# Patient Record
Sex: FEMALE | Race: WHITE | NOT HISPANIC OR LATINO | Employment: OTHER | URBAN - METROPOLITAN AREA
[De-identification: names, ages, dates, MRNs, and addresses within clinical notes are randomized per-mention and may not be internally consistent; named-entity substitution may affect disease eponyms.]

---

## 2023-06-13 LAB
ALANINE AMINOTRANSFERASE (SGPT) (U/L) IN SER/PLAS: 18 U/L (ref 7–45)
ALBUMIN (G/DL) IN SER/PLAS: 4.1 G/DL (ref 3.4–5)
ALBUMIN (MG/L) IN URINE: 24.5 MG/L
ALBUMIN/CREATININE (UG/MG) IN URINE: 13.3 UG/MG CRT (ref 0–30)
ALKALINE PHOSPHATASE (U/L) IN SER/PLAS: 109 U/L (ref 33–136)
ANION GAP IN SER/PLAS: 15 MMOL/L (ref 10–20)
ASPARTATE AMINOTRANSFERASE (SGOT) (U/L) IN SER/PLAS: 19 U/L (ref 9–39)
BILIRUBIN TOTAL (MG/DL) IN SER/PLAS: 0.5 MG/DL (ref 0–1.2)
CALCIDIOL (25 OH VITAMIN D3) (NG/ML) IN SER/PLAS: 69 NG/ML
CALCIUM (MG/DL) IN SER/PLAS: 9.9 MG/DL (ref 8.6–10.6)
CARBON DIOXIDE, TOTAL (MMOL/L) IN SER/PLAS: 24 MMOL/L (ref 21–32)
CHLORIDE (MMOL/L) IN SER/PLAS: 104 MMOL/L (ref 98–107)
CHOLESTEROL (MG/DL) IN SER/PLAS: 183 MG/DL (ref 0–199)
CHOLESTEROL IN HDL (MG/DL) IN SER/PLAS: 56.5 MG/DL
CHOLESTEROL/HDL RATIO: 3.2
CREATININE (MG/DL) IN SER/PLAS: 1.38 MG/DL (ref 0.5–1.05)
CREATININE (MG/DL) IN URINE: 184 MG/DL (ref 20–320)
ERYTHROCYTE DISTRIBUTION WIDTH (RATIO) BY AUTOMATED COUNT: 13.1 % (ref 11.5–14.5)
ERYTHROCYTE MEAN CORPUSCULAR HEMOGLOBIN CONCENTRATION (G/DL) BY AUTOMATED: 32.3 G/DL (ref 32–36)
ERYTHROCYTE MEAN CORPUSCULAR VOLUME (FL) BY AUTOMATED COUNT: 84 FL (ref 80–100)
ERYTHROCYTES (10*6/UL) IN BLOOD BY AUTOMATED COUNT: 5.16 X10E12/L (ref 4–5.2)
ESTIMATED AVERAGE GLUCOSE FOR HBA1C: 148 MG/DL
GFR FEMALE: 41 ML/MIN/1.73M2
GLUCOSE (MG/DL) IN SER/PLAS: 140 MG/DL (ref 74–99)
HEMATOCRIT (%) IN BLOOD BY AUTOMATED COUNT: 43.4 % (ref 36–46)
HEMOGLOBIN (G/DL) IN BLOOD: 14 G/DL (ref 12–16)
HEMOGLOBIN A1C/HEMOGLOBIN TOTAL IN BLOOD: 6.8 %
LDL: 102 MG/DL (ref 0–99)
LEUKOCYTES (10*3/UL) IN BLOOD BY AUTOMATED COUNT: 8.8 X10E9/L (ref 4.4–11.3)
NRBC (PER 100 WBCS) BY AUTOMATED COUNT: 0 /100 WBC (ref 0–0)
PLATELETS (10*3/UL) IN BLOOD AUTOMATED COUNT: 298 X10E9/L (ref 150–450)
POTASSIUM (MMOL/L) IN SER/PLAS: 4.7 MMOL/L (ref 3.5–5.3)
PROTEIN TOTAL: 6.9 G/DL (ref 6.4–8.2)
SODIUM (MMOL/L) IN SER/PLAS: 138 MMOL/L (ref 136–145)
THYROTROPIN (MIU/L) IN SER/PLAS BY DETECTION LIMIT <= 0.05 MIU/L: 3.12 MIU/L (ref 0.44–3.98)
TRIGLYCERIDE (MG/DL) IN SER/PLAS: 121 MG/DL (ref 0–149)
UREA NITROGEN (MG/DL) IN SER/PLAS: 20 MG/DL (ref 6–23)
VLDL: 24 MG/DL (ref 0–40)

## 2023-09-25 ENCOUNTER — LAB (OUTPATIENT)
Dept: LAB | Facility: LAB | Age: 68
End: 2023-09-25
Payer: MEDICARE

## 2023-09-25 ENCOUNTER — OFFICE VISIT (OUTPATIENT)
Dept: PRIMARY CARE | Facility: CLINIC | Age: 68
End: 2023-09-25
Payer: MEDICARE

## 2023-09-25 VITALS
SYSTOLIC BLOOD PRESSURE: 133 MMHG | DIASTOLIC BLOOD PRESSURE: 83 MMHG | HEIGHT: 67 IN | BODY MASS INDEX: 43.16 KG/M2 | OXYGEN SATURATION: 95 % | HEART RATE: 90 BPM | WEIGHT: 275 LBS

## 2023-09-25 DIAGNOSIS — Z12.11 SCREENING FOR COLON CANCER: ICD-10-CM

## 2023-09-25 DIAGNOSIS — Z00.00 MEDICARE ANNUAL WELLNESS VISIT, SUBSEQUENT: ICD-10-CM

## 2023-09-25 DIAGNOSIS — K21.9 GASTROESOPHAGEAL REFLUX DISEASE WITHOUT ESOPHAGITIS: ICD-10-CM

## 2023-09-25 DIAGNOSIS — N18.31 CHRONIC KIDNEY DISEASE, STAGE 3A (MULTI): ICD-10-CM

## 2023-09-25 DIAGNOSIS — Z00.00 ROUTINE GENERAL MEDICAL EXAMINATION AT HEALTH CARE FACILITY: Primary | ICD-10-CM

## 2023-09-25 DIAGNOSIS — E66.01 MORBID OBESITY (MULTI): ICD-10-CM

## 2023-09-25 DIAGNOSIS — M85.89 OTHER SPECIFIED DISORDERS OF BONE DENSITY AND STRUCTURE, MULTIPLE SITES: ICD-10-CM

## 2023-09-25 DIAGNOSIS — E11.42 TYPE 2 DIABETES MELLITUS WITH DIABETIC POLYNEUROPATHY, WITHOUT LONG-TERM CURRENT USE OF INSULIN (MULTI): ICD-10-CM

## 2023-09-25 DIAGNOSIS — Z12.31 ENCOUNTER FOR SCREENING MAMMOGRAM FOR MALIGNANT NEOPLASM OF BREAST: ICD-10-CM

## 2023-09-25 DIAGNOSIS — I48.19 PERSISTENT ATRIAL FIBRILLATION (MULTI): ICD-10-CM

## 2023-09-25 DIAGNOSIS — I10 PRIMARY HYPERTENSION: ICD-10-CM

## 2023-09-25 PROBLEM — I70.90 ASO (ARTERIOSCLEROSIS OBLITERANS): Status: ACTIVE | Noted: 2023-09-25

## 2023-09-25 PROBLEM — R60.0 PEDAL EDEMA: Status: ACTIVE | Noted: 2023-09-25

## 2023-09-25 PROBLEM — E78.5 HYPERLIPEMIA: Status: ACTIVE | Noted: 2023-09-25

## 2023-09-25 PROBLEM — F41.9 ANXIETY DISORDER: Status: ACTIVE | Noted: 2023-09-25

## 2023-09-25 PROBLEM — I48.91 ATRIAL FIBRILLATION (MULTI): Status: ACTIVE | Noted: 2023-09-25

## 2023-09-25 PROBLEM — E11.9 DIABETES MELLITUS WITHOUT COMPLICATION (MULTI): Status: ACTIVE | Noted: 2023-09-25

## 2023-09-25 PROBLEM — K27.9 PUD (PEPTIC ULCER DISEASE): Status: ACTIVE | Noted: 2023-09-25

## 2023-09-25 LAB
ALANINE AMINOTRANSFERASE (SGPT) (U/L) IN SER/PLAS: 13 U/L (ref 7–45)
ALBUMIN (G/DL) IN SER/PLAS: 4.2 G/DL (ref 3.4–5)
ALKALINE PHOSPHATASE (U/L) IN SER/PLAS: 100 U/L (ref 33–136)
ANION GAP IN SER/PLAS: 16 MMOL/L (ref 10–20)
ASPARTATE AMINOTRANSFERASE (SGOT) (U/L) IN SER/PLAS: 16 U/L (ref 9–39)
BILIRUBIN TOTAL (MG/DL) IN SER/PLAS: 0.6 MG/DL (ref 0–1.2)
CALCIUM (MG/DL) IN SER/PLAS: 10 MG/DL (ref 8.6–10.6)
CARBON DIOXIDE, TOTAL (MMOL/L) IN SER/PLAS: 26 MMOL/L (ref 21–32)
CHLORIDE (MMOL/L) IN SER/PLAS: 103 MMOL/L (ref 98–107)
CREATININE (MG/DL) IN SER/PLAS: 1.38 MG/DL (ref 0.5–1.05)
ESTIMATED AVERAGE GLUCOSE FOR HBA1C: 137 MG/DL
GFR FEMALE: 41 ML/MIN/1.73M2
GLUCOSE (MG/DL) IN SER/PLAS: 139 MG/DL (ref 74–99)
HEMOGLOBIN A1C/HEMOGLOBIN TOTAL IN BLOOD: 6.4 %
MAGNESIUM (MG/DL) IN SER/PLAS: 1.88 MG/DL (ref 1.6–2.4)
POTASSIUM (MMOL/L) IN SER/PLAS: 4.6 MMOL/L (ref 3.5–5.3)
PROTEIN TOTAL: 6.9 G/DL (ref 6.4–8.2)
SODIUM (MMOL/L) IN SER/PLAS: 140 MMOL/L (ref 136–145)
UREA NITROGEN (MG/DL) IN SER/PLAS: 23 MG/DL (ref 6–23)

## 2023-09-25 PROCEDURE — 36415 COLL VENOUS BLD VENIPUNCTURE: CPT

## 2023-09-25 PROCEDURE — 3044F HG A1C LEVEL LT 7.0%: CPT | Performed by: INTERNAL MEDICINE

## 2023-09-25 PROCEDURE — 1160F RVW MEDS BY RX/DR IN RCRD: CPT | Performed by: INTERNAL MEDICINE

## 2023-09-25 PROCEDURE — 1170F FXNL STATUS ASSESSED: CPT | Performed by: INTERNAL MEDICINE

## 2023-09-25 PROCEDURE — 3075F SYST BP GE 130 - 139MM HG: CPT | Performed by: INTERNAL MEDICINE

## 2023-09-25 PROCEDURE — G0439 PPPS, SUBSEQ VISIT: HCPCS | Performed by: INTERNAL MEDICINE

## 2023-09-25 PROCEDURE — 3079F DIAST BP 80-89 MM HG: CPT | Performed by: INTERNAL MEDICINE

## 2023-09-25 PROCEDURE — 83036 HEMOGLOBIN GLYCOSYLATED A1C: CPT

## 2023-09-25 PROCEDURE — 83735 ASSAY OF MAGNESIUM: CPT

## 2023-09-25 PROCEDURE — 99215 OFFICE O/P EST HI 40 MIN: CPT | Performed by: INTERNAL MEDICINE

## 2023-09-25 PROCEDURE — 1159F MED LIST DOCD IN RCRD: CPT | Performed by: INTERNAL MEDICINE

## 2023-09-25 PROCEDURE — 80053 COMPREHEN METABOLIC PANEL: CPT

## 2023-09-25 RX ORDER — LANCETS 33 GAUGE
EACH MISCELLANEOUS
COMMUNITY
Start: 2023-09-06 | End: 2024-04-19

## 2023-09-25 RX ORDER — ACETAMINOPHEN 500 MG
TABLET ORAL
COMMUNITY
Start: 2014-03-27

## 2023-09-25 RX ORDER — GLIPIZIDE 5 MG/1
1 TABLET, FILM COATED, EXTENDED RELEASE ORAL 2 TIMES DAILY
COMMUNITY
Start: 2018-04-23

## 2023-09-25 RX ORDER — METOPROLOL TARTRATE 50 MG/1
1 TABLET ORAL DAILY
COMMUNITY
Start: 2014-03-27 | End: 2023-09-25 | Stop reason: SDUPTHER

## 2023-09-25 RX ORDER — METOPROLOL TARTRATE 50 MG/1
50 TABLET ORAL 2 TIMES DAILY
Qty: 180 TABLET | Refills: 3 | Status: SHIPPED | OUTPATIENT
Start: 2023-09-25 | End: 2024-04-02 | Stop reason: SDUPTHER

## 2023-09-25 RX ORDER — GEMFIBROZIL 600 MG/1
600 TABLET, FILM COATED ORAL DAILY
COMMUNITY
Start: 2023-07-29 | End: 2024-02-08

## 2023-09-25 RX ORDER — BLOOD SUGAR DIAGNOSTIC
STRIP MISCELLANEOUS
COMMUNITY
Start: 2020-09-29 | End: 2024-04-19

## 2023-09-25 RX ORDER — PRAVASTATIN SODIUM 10 MG/1
1 TABLET ORAL DAILY
COMMUNITY
Start: 2022-09-23

## 2023-09-25 RX ORDER — IBUPROFEN 200 MG
TABLET ORAL
COMMUNITY
Start: 2017-07-19

## 2023-09-25 RX ORDER — LANCETS 30 GAUGE
EACH MISCELLANEOUS
COMMUNITY
Start: 2023-06-13

## 2023-09-25 RX ORDER — BLOOD SUGAR DIAGNOSTIC
STRIP MISCELLANEOUS
COMMUNITY
Start: 2019-02-04 | End: 2023-10-25 | Stop reason: SDUPTHER

## 2023-09-25 RX ORDER — CITALOPRAM 20 MG/1
1 TABLET, FILM COATED ORAL DAILY
COMMUNITY
Start: 2014-03-27 | End: 2024-04-02 | Stop reason: SDUPTHER

## 2023-09-25 RX ORDER — OMEPRAZOLE 40 MG/1
1 CAPSULE, DELAYED RELEASE ORAL DAILY
COMMUNITY
Start: 2019-02-14

## 2023-09-25 RX ORDER — HYDROCHLOROTHIAZIDE 25 MG/1
1 TABLET ORAL DAILY
COMMUNITY
Start: 2014-03-27 | End: 2023-11-17

## 2023-09-25 ASSESSMENT — ACTIVITIES OF DAILY LIVING (ADL)
TAKING_MEDICATION: INDEPENDENT
MANAGING_FINANCES: INDEPENDENT
BATHING: INDEPENDENT
DRESSING: INDEPENDENT
DOING_HOUSEWORK: INDEPENDENT
GROCERY_SHOPPING: INDEPENDENT

## 2023-09-25 ASSESSMENT — PATIENT HEALTH QUESTIONNAIRE - PHQ9
1. LITTLE INTEREST OR PLEASURE IN DOING THINGS: NOT AT ALL
SUM OF ALL RESPONSES TO PHQ9 QUESTIONS 1 AND 2: 0
2. FEELING DOWN, DEPRESSED OR HOPELESS: NOT AT ALL

## 2023-09-25 NOTE — PROGRESS NOTES
"Subjective   Patient ID: Destiny Reddy is a 68 y.o. female who presents for Foot Pain and Medicare Annual Wellness Visit Subsequent.    HPI     Saw Podiatry. Had xray. Told she has rheumatoid arthritis and was referred to another doctor, presumably Rheumatology. She told him about neuropathy and she said a med was recommended but she doesn't want to take it.  They also recommended orthotics but she declined.  The redness from last visit is resolved.    Saw Optho recently, told all ok.    Loose Bms resolved on metamucil    GERD controlled on PPI. Never had EGD.        Review of Systems    Objective   /83   Pulse 90   Ht 1.689 m (5' 6.5\")   Wt 125 kg (275 lb)   SpO2 95%   BMI 43.72 kg/m²     Physical Exam  Constitutional:       Appearance: Normal appearance.   Cardiovascular:      Rate and Rhythm: Tachycardia present. Rhythm irregular.      Heart sounds: No murmur heard.  Pulmonary:      Effort: Pulmonary effort is normal.      Breath sounds: Normal breath sounds.   Musculoskeletal:      Right lower leg: No edema.      Left lower leg: No edema.      Comments: R foot without edema or erythema.   Neurological:      General: No focal deficit present.      Mental Status: She is alert.      Gait: Gait normal.         Assessment/Plan   Problem List Items Addressed This Visit       Chronic kidney disease, stage 3a (CMS/[facility])    HTN (hypertension)    Gastroesophageal reflux disease without esophagitis    Relevant Orders    Referral to Gastroenterology    Morbid obesity (CMS/[facility])    Type 2 diabetes mellitus with diabetic polyneuropathy, without long-term current use of insulin (CMS/[facility])    Relevant Orders    Hemoglobin A1C    Comprehensive Metabolic Panel    Medicare annual wellness visit, subsequent    Screening for colon cancer    Relevant Orders    Referral to Gastroenterology    Other specified disorders of bone density and structure, multiple sites    Relevant Orders    XR DEXA bone density    Atrial fibrillation " (CMS/HCC)    Relevant Medications    metoprolol tartrate (Lopressor) 50 mg tablet    rivaroxaban (Xarelto) 20 mg tablet    Other Relevant Orders    Magnesium    Transthoracic Echo (TTE) Limited    Home sleep apnea test (HSAT)     Other Visit Diagnoses       Routine general medical examination at health care facility    -  Primary    Encounter for screening mammogram for malignant neoplasm of breast        Relevant Orders    BI mammo bilateral screening tomosynthesis             DM  -Check A1C  -Sees Optho  -Recently saw Podiatry but wont be following up with them    HTN - controlled    HLP - continue statin and gemfibrozil    CKD3 -stable    Anxiety - continue citalopram    GERD  - continue PPI, referred to GI    New onset afib - EKG with Afib with rate of 109. Discussed extensively with her today.  Will increase her metoprolol from once daily to BID and start xarelto. Get labs. Get echo. Sleep study.    Health maintenance:  -Colon cancer screening - Never had colonoscopy. Referred to GI today.  -Mammogram - Ordered  -Bone density - Ordered  -Immunizations    -Pneumonia - Prevnar 20 UTD   -Shingles - Shingrix UTD

## 2023-09-26 DIAGNOSIS — I48.19 PERSISTENT ATRIAL FIBRILLATION (MULTI): ICD-10-CM

## 2023-10-20 ENCOUNTER — TELEPHONE (OUTPATIENT)
Dept: PRIMARY CARE | Facility: CLINIC | Age: 68
End: 2023-10-20
Payer: MEDICARE

## 2023-10-20 NOTE — TELEPHONE ENCOUNTER
----- Message from Buster Palacio MD sent at 9/26/2023  4:55 PM EDT -----  A1C controlled at 6.4.  Kidney function around the same range as her normal. But given the slight decline, it would be best to lower her dose of xarelto. I sent a lower dose to her pharmacy.    Could not leave pt VM

## 2023-10-25 PROBLEM — L08.9 DIABETIC FOOT INFECTION (MULTI): Status: ACTIVE | Noted: 2023-10-25

## 2023-10-25 PROBLEM — R53.83 FATIGUE: Status: ACTIVE | Noted: 2023-10-25

## 2023-10-25 PROBLEM — R19.5 LOOSE BOWEL MOVEMENT: Status: ACTIVE | Noted: 2023-10-25

## 2023-10-25 PROBLEM — K27.9 PUD (PEPTIC ULCER DISEASE): Status: ACTIVE | Noted: 2023-10-25

## 2023-10-25 PROBLEM — R09.89 DIMINISHED PULSE: Status: ACTIVE | Noted: 2023-10-25

## 2023-10-25 PROBLEM — E55.9 VITAMIN D DEFICIENCY: Status: ACTIVE | Noted: 2023-10-25

## 2023-10-25 PROBLEM — E11.628 DIABETIC FOOT INFECTION (MULTI): Status: ACTIVE | Noted: 2023-10-25

## 2023-10-25 PROBLEM — Z79.4 CONTROLLED TYPE 2 DIABETES MELLITUS WITH COMPLICATION, WITH LONG-TERM CURRENT USE OF INSULIN (MULTI): Status: ACTIVE | Noted: 2023-10-25

## 2023-10-25 PROBLEM — E11.8 CONTROLLED TYPE 2 DIABETES MELLITUS WITH COMPLICATION, WITH LONG-TERM CURRENT USE OF INSULIN (MULTI): Status: ACTIVE | Noted: 2023-10-25

## 2023-10-25 RX ORDER — LORAZEPAM 0.5 MG
1 TABLET ORAL DAILY
COMMUNITY
Start: 2016-11-02

## 2023-10-25 RX ORDER — DEXLANSOPRAZOLE 60 MG/1
60 CAPSULE, DELAYED RELEASE ORAL
COMMUNITY
End: 2024-02-27 | Stop reason: WASHOUT

## 2023-10-25 RX ORDER — LANCETS 33 GAUGE
EACH MISCELLANEOUS
COMMUNITY

## 2023-10-25 RX ORDER — CEPHALEXIN 500 MG/1
500 CAPSULE ORAL 4 TIMES DAILY
COMMUNITY
End: 2023-10-26 | Stop reason: ALTCHOICE

## 2023-10-25 RX ORDER — NAPROXEN SODIUM 220 MG
220 TABLET ORAL 2 TIMES DAILY
COMMUNITY

## 2023-10-26 ENCOUNTER — OFFICE VISIT (OUTPATIENT)
Dept: PRIMARY CARE | Facility: CLINIC | Age: 68
End: 2023-10-26
Payer: MEDICARE

## 2023-10-26 VITALS
DIASTOLIC BLOOD PRESSURE: 85 MMHG | BODY MASS INDEX: 42.85 KG/M2 | OXYGEN SATURATION: 95 % | HEART RATE: 84 BPM | SYSTOLIC BLOOD PRESSURE: 125 MMHG | WEIGHT: 273 LBS | HEIGHT: 67 IN

## 2023-10-26 DIAGNOSIS — E11.42 TYPE 2 DIABETES MELLITUS WITH DIABETIC POLYNEUROPATHY, WITHOUT LONG-TERM CURRENT USE OF INSULIN (MULTI): ICD-10-CM

## 2023-10-26 DIAGNOSIS — I48.19 PERSISTENT ATRIAL FIBRILLATION (MULTI): ICD-10-CM

## 2023-10-26 DIAGNOSIS — R53.83 FATIGUE, UNSPECIFIED TYPE: ICD-10-CM

## 2023-10-26 DIAGNOSIS — Z12.11 SCREENING FOR COLON CANCER: Primary | ICD-10-CM

## 2023-10-26 PROBLEM — E11.628 DIABETIC FOOT INFECTION (MULTI): Status: RESOLVED | Noted: 2023-10-25 | Resolved: 2023-10-26

## 2023-10-26 PROBLEM — Z79.4 CONTROLLED TYPE 2 DIABETES MELLITUS WITH COMPLICATION, WITH LONG-TERM CURRENT USE OF INSULIN (MULTI): Status: RESOLVED | Noted: 2023-10-25 | Resolved: 2023-10-26

## 2023-10-26 PROBLEM — L08.9 DIABETIC FOOT INFECTION (MULTI): Status: RESOLVED | Noted: 2023-10-25 | Resolved: 2023-10-26

## 2023-10-26 PROBLEM — E11.8 CONTROLLED TYPE 2 DIABETES MELLITUS WITH COMPLICATION, WITH LONG-TERM CURRENT USE OF INSULIN (MULTI): Status: RESOLVED | Noted: 2023-10-25 | Resolved: 2023-10-26

## 2023-10-26 PROCEDURE — 3044F HG A1C LEVEL LT 7.0%: CPT | Performed by: INTERNAL MEDICINE

## 2023-10-26 PROCEDURE — 1159F MED LIST DOCD IN RCRD: CPT | Performed by: INTERNAL MEDICINE

## 2023-10-26 PROCEDURE — 99213 OFFICE O/P EST LOW 20 MIN: CPT | Performed by: INTERNAL MEDICINE

## 2023-10-26 PROCEDURE — 1160F RVW MEDS BY RX/DR IN RCRD: CPT | Performed by: INTERNAL MEDICINE

## 2023-10-26 PROCEDURE — 3074F SYST BP LT 130 MM HG: CPT | Performed by: INTERNAL MEDICINE

## 2023-10-26 PROCEDURE — 3079F DIAST BP 80-89 MM HG: CPT | Performed by: INTERNAL MEDICINE

## 2023-10-26 ASSESSMENT — PATIENT HEALTH QUESTIONNAIRE - PHQ9
SUM OF ALL RESPONSES TO PHQ9 QUESTIONS 1 AND 2: 0
2. FEELING DOWN, DEPRESSED OR HOPELESS: NOT AT ALL
1. LITTLE INTEREST OR PLEASURE IN DOING THINGS: NOT AT ALL

## 2023-11-17 DIAGNOSIS — I10 PRIMARY HYPERTENSION: Primary | ICD-10-CM

## 2023-11-17 RX ORDER — HYDROCHLOROTHIAZIDE 25 MG/1
25 TABLET ORAL DAILY
Qty: 90 TABLET | Refills: 3 | Status: SHIPPED | OUTPATIENT
Start: 2023-11-17

## 2023-12-06 ENCOUNTER — TELEPHONE (OUTPATIENT)
Dept: PRIMARY CARE | Facility: CLINIC | Age: 68
End: 2023-12-06
Payer: MEDICARE

## 2023-12-06 NOTE — TELEPHONE ENCOUNTER
Patient lvm with answering service that her medication was canceled. Called back to find out more information and was hung up on. Tried calling again and was hung up on again.

## 2024-02-08 DIAGNOSIS — E78.2 MIXED HYPERLIPIDEMIA: Primary | ICD-10-CM

## 2024-02-08 RX ORDER — GEMFIBROZIL 600 MG/1
600 TABLET, FILM COATED ORAL DAILY
Qty: 90 TABLET | Refills: 3 | Status: SHIPPED | OUTPATIENT
Start: 2024-02-08 | End: 2024-05-13 | Stop reason: SDUPTHER

## 2024-02-27 ENCOUNTER — OFFICE VISIT (OUTPATIENT)
Dept: PRIMARY CARE | Facility: CLINIC | Age: 69
End: 2024-02-27
Payer: MEDICARE

## 2024-02-27 ENCOUNTER — LAB (OUTPATIENT)
Dept: LAB | Facility: LAB | Age: 69
End: 2024-02-27
Payer: MEDICARE

## 2024-02-27 VITALS
HEIGHT: 67 IN | OXYGEN SATURATION: 97 % | DIASTOLIC BLOOD PRESSURE: 73 MMHG | BODY MASS INDEX: 42.38 KG/M2 | WEIGHT: 270 LBS | SYSTOLIC BLOOD PRESSURE: 126 MMHG | HEART RATE: 83 BPM

## 2024-02-27 DIAGNOSIS — E11.42 TYPE 2 DIABETES MELLITUS WITH DIABETIC POLYNEUROPATHY, WITHOUT LONG-TERM CURRENT USE OF INSULIN (MULTI): ICD-10-CM

## 2024-02-27 DIAGNOSIS — Z00.00 MEDICARE ANNUAL WELLNESS VISIT, SUBSEQUENT: ICD-10-CM

## 2024-02-27 DIAGNOSIS — E78.2 MIXED HYPERLIPIDEMIA: ICD-10-CM

## 2024-02-27 DIAGNOSIS — E66.01 MORBID OBESITY (MULTI): ICD-10-CM

## 2024-02-27 DIAGNOSIS — I10 PRIMARY HYPERTENSION: ICD-10-CM

## 2024-02-27 DIAGNOSIS — I48.19 PERSISTENT ATRIAL FIBRILLATION (MULTI): ICD-10-CM

## 2024-02-27 DIAGNOSIS — Z13.89 SCREENING FOR MULTIPLE CONDITIONS: ICD-10-CM

## 2024-02-27 DIAGNOSIS — F41.1 GENERALIZED ANXIETY DISORDER: ICD-10-CM

## 2024-02-27 DIAGNOSIS — Z00.00 ROUTINE GENERAL MEDICAL EXAMINATION AT HEALTH CARE FACILITY: Primary | ICD-10-CM

## 2024-02-27 DIAGNOSIS — N18.31 CHRONIC KIDNEY DISEASE, STAGE 3A (MULTI): ICD-10-CM

## 2024-02-27 PROBLEM — R19.5 LOOSE BOWEL MOVEMENT: Status: RESOLVED | Noted: 2023-10-25 | Resolved: 2024-02-27

## 2024-02-27 PROBLEM — K27.9 PUD (PEPTIC ULCER DISEASE): Status: RESOLVED | Noted: 2023-10-25 | Resolved: 2024-02-27

## 2024-02-27 LAB
ALBUMIN SERPL BCP-MCNC: 4 G/DL (ref 3.4–5)
ALP SERPL-CCNC: 102 U/L (ref 33–136)
ALT SERPL W P-5'-P-CCNC: 16 U/L (ref 7–45)
ANION GAP SERPL CALC-SCNC: 16 MMOL/L (ref 10–20)
AST SERPL W P-5'-P-CCNC: 14 U/L (ref 9–39)
BILIRUB SERPL-MCNC: 0.7 MG/DL (ref 0–1.2)
BUN SERPL-MCNC: 18 MG/DL (ref 6–23)
CALCIUM SERPL-MCNC: 9.7 MG/DL (ref 8.6–10.6)
CHLORIDE SERPL-SCNC: 104 MMOL/L (ref 98–107)
CO2 SERPL-SCNC: 26 MMOL/L (ref 21–32)
CREAT SERPL-MCNC: 1.3 MG/DL (ref 0.5–1.05)
CREAT UR-MCNC: 99.3 MG/DL (ref 20–320)
EGFRCR SERPLBLD CKD-EPI 2021: 44 ML/MIN/1.73M*2
ERYTHROCYTE [DISTWIDTH] IN BLOOD BY AUTOMATED COUNT: 14.7 % (ref 11.5–14.5)
EST. AVERAGE GLUCOSE BLD GHB EST-MCNC: 123 MG/DL
GLUCOSE SERPL-MCNC: 112 MG/DL (ref 74–99)
HBA1C MFR BLD: 5.9 %
HCT VFR BLD AUTO: 44.2 % (ref 36–46)
HGB BLD-MCNC: 14.1 G/DL (ref 12–16)
MCH RBC QN AUTO: 27.9 PG (ref 26–34)
MCHC RBC AUTO-ENTMCNC: 31.9 G/DL (ref 32–36)
MCV RBC AUTO: 87 FL (ref 80–100)
MICROALBUMIN UR-MCNC: 44.5 MG/L
MICROALBUMIN/CREAT UR: 44.8 UG/MG CREAT
NRBC BLD-RTO: 0 /100 WBCS (ref 0–0)
PLATELET # BLD AUTO: 290 X10*3/UL (ref 150–450)
POTASSIUM SERPL-SCNC: 4 MMOL/L (ref 3.5–5.3)
PROT SERPL-MCNC: 6.5 G/DL (ref 6.4–8.2)
RBC # BLD AUTO: 5.06 X10*6/UL (ref 4–5.2)
SODIUM SERPL-SCNC: 142 MMOL/L (ref 136–145)
WBC # BLD AUTO: 10 X10*3/UL (ref 4.4–11.3)

## 2024-02-27 PROCEDURE — 85027 COMPLETE CBC AUTOMATED: CPT

## 2024-02-27 PROCEDURE — 99214 OFFICE O/P EST MOD 30 MIN: CPT | Performed by: INTERNAL MEDICINE

## 2024-02-27 PROCEDURE — 1170F FXNL STATUS ASSESSED: CPT | Performed by: INTERNAL MEDICINE

## 2024-02-27 PROCEDURE — 3074F SYST BP LT 130 MM HG: CPT | Performed by: INTERNAL MEDICINE

## 2024-02-27 PROCEDURE — 82043 UR ALBUMIN QUANTITATIVE: CPT

## 2024-02-27 PROCEDURE — 82570 ASSAY OF URINE CREATININE: CPT

## 2024-02-27 PROCEDURE — 83036 HEMOGLOBIN GLYCOSYLATED A1C: CPT

## 2024-02-27 PROCEDURE — G0444 DEPRESSION SCREEN ANNUAL: HCPCS | Performed by: INTERNAL MEDICINE

## 2024-02-27 PROCEDURE — 36415 COLL VENOUS BLD VENIPUNCTURE: CPT

## 2024-02-27 PROCEDURE — 3078F DIAST BP <80 MM HG: CPT | Performed by: INTERNAL MEDICINE

## 2024-02-27 PROCEDURE — G0439 PPPS, SUBSEQ VISIT: HCPCS | Performed by: INTERNAL MEDICINE

## 2024-02-27 PROCEDURE — 3008F BODY MASS INDEX DOCD: CPT | Performed by: INTERNAL MEDICINE

## 2024-02-27 PROCEDURE — 80053 COMPREHEN METABOLIC PANEL: CPT

## 2024-02-27 ASSESSMENT — ACTIVITIES OF DAILY LIVING (ADL)
GROCERY_SHOPPING: INDEPENDENT
TAKING_MEDICATION: INDEPENDENT
DOING_HOUSEWORK: INDEPENDENT
BATHING: INDEPENDENT
MANAGING_FINANCES: INDEPENDENT
DRESSING: INDEPENDENT

## 2024-02-27 ASSESSMENT — ENCOUNTER SYMPTOMS: LOSS OF SENSATION IN FEET: 1

## 2024-02-27 ASSESSMENT — PATIENT HEALTH QUESTIONNAIRE - PHQ9
2. FEELING DOWN, DEPRESSED OR HOPELESS: NOT AT ALL
SUM OF ALL RESPONSES TO PHQ9 QUESTIONS 1 AND 2: 0
1. LITTLE INTEREST OR PLEASURE IN DOING THINGS: NOT AT ALL

## 2024-02-27 NOTE — PROGRESS NOTES
"Subjective   Reason for Visit: Destiny Reddy is an 69 y.o. female here for a Medicare Wellness visit and follow up.              HPI  Doing ok  Didn't do any of the tests ordered at prior visits.     No palpitations, no lightheadedness/dizziness  Tolerating xarelto, no bleeding issues.    Celexa helps with anxiety.No side effects.    No polyuria or polydipsia.  Not checking sugars.    +fatigue. Didn't schedule sleep study.    Sees dentist  Sees Optho yearly    Depression screening completed today using PHQ-2. See flowsheet for details.      Patient Care Team:  Buster Palacio MD as PCP - General (Internal Medicine)  Buster Palacio MD as PCP - [employer] PCP     Review of Systems    Objective   Vitals:  /73   Pulse 83   Ht 1.689 m (5' 6.5\")   Wt 122 kg (270 lb)   SpO2 97%   BMI 42.93 kg/m²       Physical Exam  Constitutional:       Appearance: Normal appearance.   HENT:      Right Ear: Tympanic membrane and ear canal normal.      Left Ear: Tympanic membrane and ear canal normal.      Mouth/Throat:      Mouth: Mucous membranes are moist.   Eyes:      Extraocular Movements: Extraocular movements intact.      Pupils: Pupils are equal, round, and reactive to light.   Cardiovascular:      Rate and Rhythm: Normal rate. Rhythm irregular.      Heart sounds: No murmur heard.  Pulmonary:      Effort: Pulmonary effort is normal.      Breath sounds: Normal breath sounds.   Musculoskeletal:      Cervical back: Neck supple.      Right lower leg: No edema.      Left lower leg: No edema.      Comments: Foot exam - no ulcerations or skin breakdown bilaterally, DP pulses 2+   Neurological:      Mental Status: She is alert.      Cranial Nerves: No cranial nerve deficit.      Gait: Gait normal.         Assessment/Plan   Problem List Items Addressed This Visit       Anxiety disorder    Chronic kidney disease, stage 3a (CMS/HCC)    HTN (hypertension)    Hyperlipemia    Morbid obesity (CMS/HCC)    Type 2 " diabetes mellitus with diabetic polyneuropathy, without long-term current use of insulin (CMS/[facility])    Relevant Orders    CBC    Comprehensive Metabolic Panel    Hemoglobin A1C    Albumin , Urine Random    Medicare annual wellness visit, subsequent    Atrial fibrillation (CMS/[facility])    Relevant Medications    rivaroxaban (Xarelto) 15 mg tablet     Other Visit Diagnoses       Routine general medical examination at health care facility    -  Primary    Body mass index (BMI) 40.0-44.9, adult (CMS/[facility])                   DM - controlled  -Sees Optho  -With neuropathy. Has seen Podiatry but wont be following up with them. She doesn't want any med for this.     HTN - controlled     HLP - continue statin and gemfibrozil     CKD3 -stable     Anxiety - continue citalopram     GERD  - continue PPI, referred to GI at prior visit     Afib    -Continue metoprolol   -Continue xarelto  -Get echo  -Sleep study     Health maintenance:  -Colon cancer screening - Never had colonoscopy. Referred to GI at last visit but now she would prefer Cologuard order is in from last time.  -Mammogram - order is in from last time, advised to get done.  -Bone density - order is in from last time, advised to get done.  -Immunizations    -Pneumonia - Prevnar 20 UTD   -Shingles - Shingrix UTD     Follow up 6 months

## 2024-02-27 NOTE — PATIENT INSTRUCTIONS
Please complete the Cologuard, mammogram, bone density, echocardiogram, and sleep study. All of these orders are in your chart.

## 2024-02-28 DIAGNOSIS — E11.42 TYPE 2 DIABETES MELLITUS WITH DIABETIC POLYNEUROPATHY, WITHOUT LONG-TERM CURRENT USE OF INSULIN (MULTI): Primary | ICD-10-CM

## 2024-02-28 RX ORDER — LISINOPRIL 5 MG/1
5 TABLET ORAL DAILY
Qty: 90 TABLET | Refills: 1 | Status: SHIPPED | OUTPATIENT
Start: 2024-02-28 | End: 2024-04-07 | Stop reason: SDUPTHER

## 2024-03-04 ENCOUNTER — TELEPHONE (OUTPATIENT)
Dept: PRIMARY CARE | Facility: CLINIC | Age: 69
End: 2024-03-04
Payer: MEDICARE

## 2024-03-04 NOTE — TELEPHONE ENCOUNTER
----- Message from Buster Palacio MD sent at 2/28/2024 10:35 AM EST -----  A1C controlled at 5.9.   There was a little protein in her urine. Given this, I recommend starting a med called lisinopril which helps protect the kidneys in patients with diabetes and chronic kidney issues. I sent this in. We will want to check another set of blood work 1-2 weeks after starting this. I ordered the labs too.

## 2024-03-06 ENCOUNTER — TELEPHONE (OUTPATIENT)
Dept: PRIMARY CARE | Facility: CLINIC | Age: 69
End: 2024-03-06
Payer: MEDICARE

## 2024-03-06 NOTE — TELEPHONE ENCOUNTER
Let her know I assure her this is a very safe medication and is very important to protect the kidneys in diabetics. It is very beneficial. I recommend she start it and then come see me in a few weeks to follow up on this.

## 2024-03-07 NOTE — TELEPHONE ENCOUNTER
Let her know I assure her this is a very safe medication and is very important to protect the kidneys in diabetics. It is very beneficial. I recommend she start it and then come see me in a few weeks to follow up on this.       I spoke to the patient to assure her of the safety of the medication.  She scheduled a follow up appoinment.

## 2024-03-08 ENCOUNTER — TELEPHONE (OUTPATIENT)
Dept: PRIMARY CARE | Facility: CLINIC | Age: 69
End: 2024-03-08
Payer: MEDICARE

## 2024-03-08 NOTE — TELEPHONE ENCOUNTER
Spoke to patient to give results : per dr albarran A1C controlled at 5.9.   There was a little protein in her urine. Given this, I recommend starting a med called lisinopril which helps protect the kidneys in patients with diabetes and chronic kidney issues. I sent this in. We will want to check another set of blood work 1-2 weeks after starting this. I ordered the labs too.     Appointment is made for follow up

## 2024-04-02 ENCOUNTER — APPOINTMENT (OUTPATIENT)
Dept: PRIMARY CARE | Facility: CLINIC | Age: 69
End: 2024-04-02
Payer: MEDICARE

## 2024-04-02 ENCOUNTER — TELEPHONE (OUTPATIENT)
Dept: PRIMARY CARE | Facility: CLINIC | Age: 69
End: 2024-04-02

## 2024-04-02 DIAGNOSIS — F41.1 GENERALIZED ANXIETY DISORDER: Primary | ICD-10-CM

## 2024-04-02 DIAGNOSIS — I48.19 PERSISTENT ATRIAL FIBRILLATION (MULTI): ICD-10-CM

## 2024-04-02 RX ORDER — CITALOPRAM 20 MG/1
20 TABLET, FILM COATED ORAL DAILY
Qty: 90 TABLET | Refills: 3 | Status: SHIPPED | OUTPATIENT
Start: 2024-04-02 | End: 2025-04-02

## 2024-04-02 RX ORDER — METOPROLOL TARTRATE 50 MG/1
50 TABLET ORAL 2 TIMES DAILY
Qty: 180 TABLET | Refills: 3 | Status: SHIPPED | OUTPATIENT
Start: 2024-04-02 | End: 2025-04-02

## 2024-04-02 NOTE — TELEPHONE ENCOUNTER
Pt is going to call to see what the price would be for Eliquis. She is going to call us back with what she decides.

## 2024-04-02 NOTE — TELEPHONE ENCOUNTER
I sent those refills.  She should call insurance and see if Eliquis would be covered any cheaper. If that is still too expensive then we will have to do coumadin, which isnt ideal because then she would have to go to the coumadin clinic to have her levels monitor at least once per month or more.  In the mean time before this gets sorted out, if she is completely out of xarelto she should start on a full dose 325mg aspirin once daily.

## 2024-04-02 NOTE — TELEPHONE ENCOUNTER
Pt has been unable to get her Xarelto because this year it has gone way up in price. She was wondering if she could take another medication like it instead that may be cheaper.    [facility] pharmacy is still good.     She also requested refills for her Metoprolol and Citalopram at [facility] as well.

## 2024-04-07 DIAGNOSIS — E11.42 TYPE 2 DIABETES MELLITUS WITH DIABETIC POLYNEUROPATHY, WITHOUT LONG-TERM CURRENT USE OF INSULIN (MULTI): ICD-10-CM

## 2024-04-07 DIAGNOSIS — I48.19 PERSISTENT ATRIAL FIBRILLATION (MULTI): ICD-10-CM

## 2024-04-07 RX ORDER — LISINOPRIL 5 MG/1
5 TABLET ORAL DAILY
Qty: 90 TABLET | Refills: 1 | Status: SHIPPED | OUTPATIENT
Start: 2024-04-07 | End: 2024-10-04

## 2024-04-18 DIAGNOSIS — E11.42 TYPE 2 DIABETES MELLITUS WITH DIABETIC POLYNEUROPATHY, WITHOUT LONG-TERM CURRENT USE OF INSULIN (MULTI): Primary | ICD-10-CM

## 2024-04-19 RX ORDER — BLOOD SUGAR DIAGNOSTIC
STRIP MISCELLANEOUS
Qty: 100 STRIP | Refills: 2 | Status: SHIPPED | OUTPATIENT
Start: 2024-04-19

## 2024-04-19 RX ORDER — LANCETS 33 GAUGE
EACH MISCELLANEOUS
Qty: 100 EACH | Refills: 2 | Status: SHIPPED | OUTPATIENT
Start: 2024-04-19

## 2024-05-13 DIAGNOSIS — E78.2 MIXED HYPERLIPIDEMIA: ICD-10-CM

## 2024-05-13 RX ORDER — GEMFIBROZIL 600 MG/1
600 TABLET, FILM COATED ORAL DAILY
Qty: 90 TABLET | Refills: 3 | Status: SHIPPED | OUTPATIENT
Start: 2024-05-13

## 2024-06-04 ENCOUNTER — APPOINTMENT (OUTPATIENT)
Dept: PRIMARY CARE | Facility: CLINIC | Age: 69
End: 2024-06-04
Payer: MEDICARE

## 2024-07-18 DIAGNOSIS — E11.42 TYPE 2 DIABETES MELLITUS WITH DIABETIC POLYNEUROPATHY, WITHOUT LONG-TERM CURRENT USE OF INSULIN (MULTI): ICD-10-CM

## 2024-07-18 RX ORDER — LISINOPRIL 5 MG/1
5 TABLET ORAL DAILY
Qty: 90 TABLET | Refills: 1 | Status: SHIPPED | OUTPATIENT
Start: 2024-07-18 | End: 2025-01-14

## 2024-08-10 DIAGNOSIS — I10 PRIMARY HYPERTENSION: ICD-10-CM

## 2024-08-12 RX ORDER — HYDROCHLOROTHIAZIDE 25 MG/1
25 TABLET ORAL DAILY
Qty: 100 TABLET | Refills: 2 | Status: SHIPPED | OUTPATIENT
Start: 2024-08-12

## 2024-08-27 ENCOUNTER — APPOINTMENT (OUTPATIENT)
Dept: PRIMARY CARE | Facility: CLINIC | Age: 69
End: 2024-08-27
Payer: MEDICARE

## 2024-09-12 ENCOUNTER — LAB (OUTPATIENT)
Dept: LAB | Facility: LAB | Age: 69
End: 2024-09-12
Payer: MEDICARE

## 2024-09-12 ENCOUNTER — APPOINTMENT (OUTPATIENT)
Dept: PRIMARY CARE | Facility: CLINIC | Age: 69
End: 2024-09-12
Payer: MEDICARE

## 2024-09-12 VITALS
HEART RATE: 89 BPM | BODY MASS INDEX: 42.22 KG/M2 | OXYGEN SATURATION: 98 % | HEIGHT: 67 IN | WEIGHT: 269 LBS | DIASTOLIC BLOOD PRESSURE: 86 MMHG | SYSTOLIC BLOOD PRESSURE: 121 MMHG

## 2024-09-12 DIAGNOSIS — I48.19 PERSISTENT ATRIAL FIBRILLATION (MULTI): ICD-10-CM

## 2024-09-12 DIAGNOSIS — Z23 NEED FOR INFLUENZA VACCINATION: ICD-10-CM

## 2024-09-12 DIAGNOSIS — I10 PRIMARY HYPERTENSION: ICD-10-CM

## 2024-09-12 DIAGNOSIS — E11.42 TYPE 2 DIABETES MELLITUS WITH DIABETIC POLYNEUROPATHY, WITHOUT LONG-TERM CURRENT USE OF INSULIN (MULTI): ICD-10-CM

## 2024-09-12 DIAGNOSIS — E11.42 TYPE 2 DIABETES MELLITUS WITH DIABETIC POLYNEUROPATHY, WITHOUT LONG-TERM CURRENT USE OF INSULIN (MULTI): Primary | ICD-10-CM

## 2024-09-12 DIAGNOSIS — E66.01 MORBID OBESITY (MULTI): ICD-10-CM

## 2024-09-12 PROBLEM — R09.89 DIMINISHED PULSE: Status: RESOLVED | Noted: 2023-10-25 | Resolved: 2024-09-12

## 2024-09-12 LAB
ERYTHROCYTE [DISTWIDTH] IN BLOOD BY AUTOMATED COUNT: 12.8 % (ref 11.5–14.5)
EST. AVERAGE GLUCOSE BLD GHB EST-MCNC: 123 MG/DL
HBA1C MFR BLD: 5.9 %
HCT VFR BLD AUTO: 47 % (ref 36–46)
HGB BLD-MCNC: 15 G/DL (ref 12–16)
MCH RBC QN AUTO: 27.5 PG (ref 26–34)
MCHC RBC AUTO-ENTMCNC: 31.9 G/DL (ref 32–36)
MCV RBC AUTO: 86 FL (ref 80–100)
NRBC BLD-RTO: 0 /100 WBCS (ref 0–0)
PLATELET # BLD AUTO: 316 X10*3/UL (ref 150–450)
RBC # BLD AUTO: 5.45 X10*6/UL (ref 4–5.2)
WBC # BLD AUTO: 10.7 X10*3/UL (ref 4.4–11.3)

## 2024-09-12 PROCEDURE — 83036 HEMOGLOBIN GLYCOSYLATED A1C: CPT

## 2024-09-12 PROCEDURE — 80061 LIPID PANEL: CPT

## 2024-09-12 PROCEDURE — 1158F ADVNC CARE PLAN TLK DOCD: CPT | Performed by: INTERNAL MEDICINE

## 2024-09-12 PROCEDURE — 90662 IIV NO PRSV INCREASED AG IM: CPT | Performed by: INTERNAL MEDICINE

## 2024-09-12 PROCEDURE — G2211 COMPLEX E/M VISIT ADD ON: HCPCS | Performed by: INTERNAL MEDICINE

## 2024-09-12 PROCEDURE — 1123F ACP DISCUSS/DSCN MKR DOCD: CPT | Performed by: INTERNAL MEDICINE

## 2024-09-12 PROCEDURE — 3008F BODY MASS INDEX DOCD: CPT | Performed by: INTERNAL MEDICINE

## 2024-09-12 PROCEDURE — 4010F ACE/ARB THERAPY RXD/TAKEN: CPT | Performed by: INTERNAL MEDICINE

## 2024-09-12 PROCEDURE — 36415 COLL VENOUS BLD VENIPUNCTURE: CPT

## 2024-09-12 PROCEDURE — 3079F DIAST BP 80-89 MM HG: CPT | Performed by: INTERNAL MEDICINE

## 2024-09-12 PROCEDURE — G0008 ADMIN INFLUENZA VIRUS VAC: HCPCS | Performed by: INTERNAL MEDICINE

## 2024-09-12 PROCEDURE — 1159F MED LIST DOCD IN RCRD: CPT | Performed by: INTERNAL MEDICINE

## 2024-09-12 PROCEDURE — 1036F TOBACCO NON-USER: CPT | Performed by: INTERNAL MEDICINE

## 2024-09-12 PROCEDURE — 3074F SYST BP LT 130 MM HG: CPT | Performed by: INTERNAL MEDICINE

## 2024-09-12 PROCEDURE — 99214 OFFICE O/P EST MOD 30 MIN: CPT | Performed by: INTERNAL MEDICINE

## 2024-09-12 PROCEDURE — 80053 COMPREHEN METABOLIC PANEL: CPT

## 2024-09-12 PROCEDURE — 3060F POS MICROALBUMINURIA REV: CPT | Performed by: INTERNAL MEDICINE

## 2024-09-12 PROCEDURE — 85027 COMPLETE CBC AUTOMATED: CPT

## 2024-09-12 PROCEDURE — 3044F HG A1C LEVEL LT 7.0%: CPT | Performed by: INTERNAL MEDICINE

## 2024-09-12 ASSESSMENT — PATIENT HEALTH QUESTIONNAIRE - PHQ9
1. LITTLE INTEREST OR PLEASURE IN DOING THINGS: NOT AT ALL
SUM OF ALL RESPONSES TO PHQ9 QUESTIONS 1 & 2: 0
2. FEELING DOWN, DEPRESSED OR HOPELESS: NOT AT ALL

## 2024-09-12 NOTE — PROGRESS NOTES
"Subjective   Patient ID: Destiny Reddy is a 69 y.o. female who presents for Follow-up.    HPI   Started lisinopril. Tolerating.    Metamucil controls bowels.    Weight stable   walks daily but she doesn't want to. No physical activity.  Diet high in carbs  Checks sugars infrequently.    Compliant with xarelto, denies bleeding issues.  Denies heart fluttering.      Review of Systems    Objective   /86 (BP Location: Right arm, Patient Position: Sitting)   Pulse 89   Ht 1.689 m (5' 6.5\")   Wt 122 kg (269 lb)   SpO2 98%   BMI 42.77 kg/m²     Physical Exam  Constitutional:       Appearance: Normal appearance.   Cardiovascular:      Rate and Rhythm: Normal rate. Rhythm irregular.      Heart sounds: No murmur heard.  Pulmonary:      Effort: Pulmonary effort is normal.      Breath sounds: Normal breath sounds.   Musculoskeletal:      Right lower leg: No edema.      Left lower leg: No edema.      Comments: Foot exam - no ulcerations or skin breakdown bilaterally, DP pulses 2+   Neurological:      Mental Status: She is alert.      Assessment/Plan   Problem List Items Addressed This Visit             ICD-10-CM    HTN (hypertension) I10    Morbid obesity (Multi) E66.01    Type 2 diabetes mellitus with diabetic polyneuropathy, without long-term current use of insulin (Multi) - Primary E11.42    Relevant Orders    CBC    Comprehensive Metabolic Panel    Lipid Panel    Hemoglobin A1C    Atrial fibrillation (Multi) I48.91     Other Visit Diagnoses         Codes    Need for influenza vaccination     Z23    Relevant Orders    Flu vaccine, trivalent, preservative free, HIGH-DOSE, age 65y+ (Fluzone) (Completed)               DM - controlled  -Recommended starting GLP-1 and stopping glipizide but she declines.  -Sees Optho  -With neuropathy. Has seen Podiatry but wont be following up with them. She doesn't want any med for this.     HTN - controlled     HLP - continue statin and gemfibrozil     CKD3 -stable     Anxiety - " continue citalopram     GERD  - continue PPI, referred to GI at prior visit     Afib    -Continue metoprolol   -Continue xarelto  -Ordered echo and sleep study at prior visit but never done     Health maintenance:  -Colon cancer screening - Never had colonoscopy. Referred to GI at last visit but now she would prefer Cologuard order is in from last time.  -Mammogram - order is in from prior, advised to get done.  -Bone density - order is in from prior, advised to get done.  -Immunizations    -Pneumonia - Prevnar 20 UTD   -Shingles - Shingrix UTD     Follow up 6 months MWV

## 2024-09-13 LAB
ALBUMIN SERPL BCP-MCNC: 4.2 G/DL (ref 3.4–5)
ALP SERPL-CCNC: 86 U/L (ref 33–136)
ALT SERPL W P-5'-P-CCNC: 11 U/L (ref 7–45)
ANION GAP SERPL CALC-SCNC: 19 MMOL/L (ref 10–20)
AST SERPL W P-5'-P-CCNC: 13 U/L (ref 9–39)
BILIRUB SERPL-MCNC: 0.7 MG/DL (ref 0–1.2)
BUN SERPL-MCNC: 25 MG/DL (ref 6–23)
CALCIUM SERPL-MCNC: 9.9 MG/DL (ref 8.6–10.6)
CHLORIDE SERPL-SCNC: 104 MMOL/L (ref 98–107)
CHOLEST SERPL-MCNC: 168 MG/DL (ref 0–199)
CHOLESTEROL/HDL RATIO: 3.2
CO2 SERPL-SCNC: 24 MMOL/L (ref 21–32)
CREAT SERPL-MCNC: 1.5 MG/DL (ref 0.5–1.05)
EGFRCR SERPLBLD CKD-EPI 2021: 37 ML/MIN/1.73M*2
GLUCOSE SERPL-MCNC: 118 MG/DL (ref 74–99)
HDLC SERPL-MCNC: 52.2 MG/DL
LDLC SERPL CALC-MCNC: 92 MG/DL
NON HDL CHOLESTEROL: 116 MG/DL (ref 0–149)
POTASSIUM SERPL-SCNC: 4.9 MMOL/L (ref 3.5–5.3)
PROT SERPL-MCNC: 6.9 G/DL (ref 6.4–8.2)
SODIUM SERPL-SCNC: 142 MMOL/L (ref 136–145)
TRIGL SERPL-MCNC: 117 MG/DL (ref 0–149)
VLDL: 23 MG/DL (ref 0–40)

## 2024-09-16 ENCOUNTER — TELEPHONE (OUTPATIENT)
Dept: PRIMARY CARE | Facility: CLINIC | Age: 69
End: 2024-09-16
Payer: MEDICARE

## 2024-09-16 DIAGNOSIS — E11.42 TYPE 2 DIABETES MELLITUS WITH DIABETIC POLYNEUROPATHY, WITHOUT LONG-TERM CURRENT USE OF INSULIN: Primary | ICD-10-CM

## 2024-09-16 NOTE — TELEPHONE ENCOUNTER
----- Message from Buster Hakeem sent at 9/15/2024  7:56 AM EDT -----  A1C is controlled at 5.9. Rest of labs stable. Kidney function is in the same range it has been.  I would recommend changing her glipizide to jardiance given her mildly reduced kidney function. Jardiance will control the sugars but is also beneficial and protective for the kidneys and can preserve kidney function down the road. If she is ok with this I can send it in.

## 2024-09-16 NOTE — TELEPHONE ENCOUNTER
Spoke to patient to give results per Dr Palacio : A1C is controlled at 5.9. Rest of labs stable. Kidney function is in the same range it has been.   I would recommend changing her glipizide to jardiance given her mildly reduced kidney function. Jardiance will control the sugars but is also beneficial and protective for the kidneys and can preserve kidney function down the road. If she is ok with this I can send it in.     Dr Palacio patient is alright with starting the jardiance [facility].  Patient also stated she IS interested in the 1 time trial shot that you mentioned.  Patient could not remember the shot name.

## 2024-09-16 NOTE — TELEPHONE ENCOUNTER
I would suggest either the jardiance or the injection, not both. For her, I think jardiance will be better to protect the kidneys. I would suggest just starting this and stopping the glipizide and we can talk about the injection down the road if needed. I sent [facility] to [facility].

## 2024-09-17 ENCOUNTER — TELEPHONE (OUTPATIENT)
Dept: PRIMARY CARE | Facility: CLINIC | Age: 69
End: 2024-09-17
Payer: MEDICARE

## 2024-09-17 NOTE — TELEPHONE ENCOUNTER
Spoke to patient to give follow up : per Dr Palacio   I would suggest either the jardiance or the injection, not both. For her, I think jardiance will be better to protect the kidneys. I would suggest just starting this and stopping the glipizide and we can talk about the injection down the road if needed. I [facility].       Patient understood and agrees with Dr Palacio suggestion

## 2024-09-30 ENCOUNTER — TELEPHONE (OUTPATIENT)
Dept: PRIMARY CARE | Facility: CLINIC | Age: 69
End: 2024-09-30

## 2024-09-30 ENCOUNTER — TELEMEDICINE (OUTPATIENT)
Dept: PHARMACY | Facility: HOSPITAL | Age: 69
End: 2024-09-30
Payer: MEDICARE

## 2024-09-30 DIAGNOSIS — E11.42 TYPE 2 DIABETES MELLITUS WITH DIABETIC POLYNEUROPATHY, WITHOUT LONG-TERM CURRENT USE OF INSULIN: Primary | ICD-10-CM

## 2024-09-30 DIAGNOSIS — I48.19 PERSISTENT ATRIAL FIBRILLATION (MULTI): ICD-10-CM

## 2024-09-30 NOTE — TELEPHONE ENCOUNTER
Pharmacy referral was not placed for Jardiance. Jardiance was also sent in to [facility] instead of . Placed pharmacy referral and have reached out to patient. Refer to telehealth visit on 9/30/24 for more information.     Tonny Whitaker, PharmD  Clinical Pharmacist

## 2024-09-30 NOTE — TELEPHONE ENCOUNTER
Patient called because she had not received jardiance as of yet.  Gave information to Tonny she is going to reach out to the patient.

## 2024-09-30 NOTE — PROGRESS NOTES
"Pharmacist Clinic: Diabetes   Initial Visit  Destiny Reddy is a 69 y.o. female was referred to Clinical Pharmacy Team for diabetes management.     Referring Provider: Buster Palacio MD    Subjective   HPI  Known diabetes complications include CKD and obesity  Endocrinology provider? No  Does patient have proteinuria? N/a  Special needs/barriers to therapy: Jardiance coverage     Diabetes Pharmacotherapy  - Jardiance 10 mg once daily -- never received     Previous Diabetes Pharmacotherapy   - Glipizide -- no fill hx but mentioned in Hakeem note??    Adherence: Takes medication as directed and reports no missed doses  Affordability/Accessibility  Test Claim:   Jardiance $47/month     Risk Reducing Meds  On ACEi/ARB: Yes   On Statin: Yes   On SGLT2i: Yes   On GLP1-RA: No     Glucose Monitoring:  Patient is using glucometer; n/a      Hypoglycemia? Patient denies signs and symptoms  Hyperglycemia? Denies signs and symptoms      Diet: not asked  Physical activity: not asked      Objective   Lab Review  Lab Results   Component Value Date    BILITOT 0.7 09/12/2024    CALCIUM 9.9 09/12/2024    CO2 24 09/12/2024     09/12/2024    CREATININE 1.50 (H) 09/12/2024    GLUCOSE 118 (H) 09/12/2024    ALKPHOS 86 09/12/2024    K 4.9 09/12/2024    PROT 6.9 09/12/2024     09/12/2024    AST 13 09/12/2024    ALT 11 09/12/2024    BUN 25 (H) 09/12/2024    ANIONGAP 19 09/12/2024    MG 1.88 09/25/2023    ALBUMIN 4.2 09/12/2024    GFRF 41 (A) 09/25/2023     Lab Results   Component Value Date    TRIG 117 09/12/2024    CHOL 168 09/12/2024    LDLCALC 92 09/12/2024    HDL 52.2 09/12/2024     Hemoglobin A1C (%)   Date Value   09/12/2024 5.9 (H)   02/27/2024 5.9 (H)   09/25/2023 6.4 (A)   06/13/2023 6.8 (A)   09/22/2022 6.8 (A)     No components found for: \"UACR\"  There is no height or weight on file to calculate BMI.  Wt Readings from Last 3 Encounters:   09/12/24 122 kg (269 lb)   02/27/24 122 kg (270 lb)   10/26/23 124 kg (273 lb) "          The 10-year ASCVD risk score (Raymundo KHAN, et al., 2019) is: 20%    Values used to calculate the score:      Age: 69 years      Sex: Female      Is Non- : No      Diabetic: Yes      Tobacco smoker: No      Systolic Blood Pressure: 121 mmHg      Is BP treated: Yes      HDL Cholesterol: 52.2 mg/dL      Total Cholesterol: 168 mg/dL      Drug Interactions  No significant drug-drug interactions exist that require change in therapy.    Assessment/Plan   Problem List Items Addressed This Visit          Endocrine/Metabolic    Type 2 diabetes mellitus with diabetic polyneuropathy, without long-term current use of insulin (Multi) - Primary    Relevant Medications    empagliflozin (Jardiance) 10 mg     Patients diabetes is well controlled with most recent A1c of 5.9% (goal < 7 %). Glycemic control is estimated to be at goal according to last A1c.     Patient referred for help with access to new start Jardiance. Jardiance is $47/month through her insurance which is cost prohibitive for her. Discussed  PAP in which patient makes< 400% FPL annually. Will send Jardiance to [facility] and submit application for patient assistance.       Jardiance Education:  - Educated patient on Jardiance MOA, expectations, administration and monitoring parameters.  - Counseled patient that Jardiance puts them at risk for UTI/genital candidiasis; maintaining and practicing proper hygiene will minimize risk of this side effect.   - Encouraged patient to increase fluid intake as Jardiance may cause dehydration due to potential frequent urination. Reviewed benefits in addition to glycemic control including cardioprotection, renal protection and weight loss.         Patient Assistance Screening (VAF)  Patient verbally reports monthly or yearly income which is less than 400% federal poverty level  Application for program has been submitted for the following medications:   Jardiance  Patient has been informed that  program team will be reaching out to them to discuss necessary documentation, instructed to answer phone/return voicemail.   Patient aware this process may take up to 2 weeks once income documents have been sent to the team.  If approved, medication must be filled through [facility] pharmacy and may be picked up or mailed to patient.   If approved, medication will be billed through insurance, and patient assistance team will pay the copay. This will result in a $0 copay for the patient.  Counseled patient on mechanism of action, side effects, contraindications, and what to do if the patient misses a dose. All patients questions were answered.      Plan:  START  - Jardiance 10 mg once daily       Follow Up: PRN as PAP processes   PCP Follow Up: 3/13/2025    Tonny Whitaker, PharmD    Continue all meds under the continuation of care with the referring provider and clinical pharmacy team.

## 2024-10-01 ENCOUNTER — TELEPHONE (OUTPATIENT)
Dept: PHARMACY | Facility: HOSPITAL | Age: 69
End: 2024-10-01
Payer: MEDICARE

## 2024-10-01 PROCEDURE — RXMED WILLOW AMBULATORY MEDICATION CHARGE

## 2024-10-01 NOTE — TELEPHONE ENCOUNTER
Patient Assistance Program (PAP):     We are pleased to inform you that your application for assistance has been approved.     This approval is valid through October 1, 2025  as long as the following criteria continue to be satisfied:    Your medication(s) (Jardiance) remains covered under your current insurance plan.  Your prescriber does not discontinue therapy.  You do not seek reimbursement from any other private or government-funded programs for the medication.    Under this program, the pharmacy will first bill your insurance plan for your indemnified specified medication. The [employer] Assistance Fund (VAF) will then offset your copay balance, so that your out-of pocket expense for your specialty medication will be $0.00.    Patient also on Xarelto which is included in PAP list of medications. Will reach out to PCP to get referral for Afib in order to add Xarelto to PAP profile in order to get Xarelto at no cost.     Tonny Whitaker, PharmD   Clinical Pharmacy Specialist

## 2024-10-02 DIAGNOSIS — I48.19 PERSISTENT ATRIAL FIBRILLATION (MULTI): Primary | ICD-10-CM

## 2024-10-02 PROCEDURE — RXMED WILLOW AMBULATORY MEDICATION CHARGE

## 2024-10-02 NOTE — PROGRESS NOTES
Patient takes Xarelto 15 mg daily which is also cost prohibitive. Received OK from PCP to add pharmacy referral for Afib since patient is approved for  PAP for the Jardiance, will add Xarelto to the profile to receive at no charge. Will send a years worth of refills to [facility] and have shipped to patient.     Tonny Whitaker, PharmD  Clinical Pharmacist

## 2024-10-03 ENCOUNTER — PHARMACY VISIT (OUTPATIENT)
Dept: PHARMACY | Facility: CLINIC | Age: 69
End: 2024-10-03
Payer: COMMERCIAL

## 2024-10-17 DIAGNOSIS — E11.42 TYPE 2 DIABETES MELLITUS WITH DIABETIC POLYNEUROPATHY, WITHOUT LONG-TERM CURRENT USE OF INSULIN: ICD-10-CM

## 2024-10-18 RX ORDER — LISINOPRIL 5 MG/1
5 TABLET ORAL DAILY
Qty: 100 TABLET | Refills: 2 | Status: SHIPPED | OUTPATIENT
Start: 2024-10-18

## 2024-12-30 PROCEDURE — RXMED WILLOW AMBULATORY MEDICATION CHARGE

## 2024-12-31 ENCOUNTER — PHARMACY VISIT (OUTPATIENT)
Dept: PHARMACY | Facility: CLINIC | Age: 69
End: 2024-12-31
Payer: COMMERCIAL

## 2024-12-31 DIAGNOSIS — E11.42 TYPE 2 DIABETES MELLITUS WITH DIABETIC POLYNEUROPATHY, WITHOUT LONG-TERM CURRENT USE OF INSULIN: ICD-10-CM

## 2025-01-02 RX ORDER — BLOOD SUGAR DIAGNOSTIC
STRIP MISCELLANEOUS
Qty: 100 STRIP | Refills: 2 | Status: SHIPPED | OUTPATIENT
Start: 2025-01-02

## 2025-01-02 RX ORDER — LANCETS 33 GAUGE
EACH MISCELLANEOUS
Qty: 100 EACH | Refills: 2 | Status: SHIPPED | OUTPATIENT
Start: 2025-01-02

## 2025-01-21 ENCOUNTER — TELEPHONE (OUTPATIENT)
Dept: PRIMARY CARE | Facility: CLINIC | Age: 70
End: 2025-01-21
Payer: MEDICARE

## 2025-01-21 DIAGNOSIS — E78.2 MIXED HYPERLIPIDEMIA: ICD-10-CM

## 2025-01-21 RX ORDER — GEMFIBROZIL 600 MG/1
600 TABLET, FILM COATED ORAL DAILY
Qty: 90 TABLET | Refills: 3 | Status: SHIPPED | OUTPATIENT
Start: 2025-01-21

## 2025-01-21 NOTE — TELEPHONE ENCOUNTER
Rx Refill Request Telephone Encounter    Name:  Destiny Reddy  :  [id number]  Medication Name:  gemfibrozil (Lopid) 600 mg tablet   600 mg    1 tab;e by mouth daily       Specific Pharmacy location:  [facility]  Date of last appointment:  2024  Date of next appointment:  2024  Best number to reach patient:  [phone]

## 2025-01-29 DIAGNOSIS — I48.19 PERSISTENT ATRIAL FIBRILLATION (MULTI): ICD-10-CM

## 2025-01-29 DIAGNOSIS — E11.42 TYPE 2 DIABETES MELLITUS WITH DIABETIC POLYNEUROPATHY, WITHOUT LONG-TERM CURRENT USE OF INSULIN: ICD-10-CM

## 2025-01-29 DIAGNOSIS — F41.1 GENERALIZED ANXIETY DISORDER: ICD-10-CM

## 2025-01-29 DIAGNOSIS — E78.2 MIXED HYPERLIPIDEMIA: ICD-10-CM

## 2025-01-29 DIAGNOSIS — I10 PRIMARY HYPERTENSION: ICD-10-CM

## 2025-01-29 RX ORDER — LISINOPRIL 5 MG/1
5 TABLET ORAL DAILY
Qty: 100 TABLET | Refills: 2 | Status: SHIPPED | OUTPATIENT
Start: 2025-01-29

## 2025-01-29 RX ORDER — HYDROCHLOROTHIAZIDE 25 MG/1
25 TABLET ORAL DAILY
Qty: 100 TABLET | Refills: 2 | Status: SHIPPED | OUTPATIENT
Start: 2025-01-29

## 2025-01-29 RX ORDER — GEMFIBROZIL 600 MG/1
600 TABLET, FILM COATED ORAL DAILY
Qty: 90 TABLET | Refills: 3 | Status: SHIPPED | OUTPATIENT
Start: 2025-01-29

## 2025-01-29 RX ORDER — METOPROLOL TARTRATE 50 MG/1
50 TABLET ORAL 2 TIMES DAILY
Qty: 180 TABLET | Refills: 3 | Status: SHIPPED | OUTPATIENT
Start: 2025-01-29 | End: 2026-01-29

## 2025-01-29 RX ORDER — CITALOPRAM 20 MG/1
20 TABLET, FILM COATED ORAL DAILY
Qty: 90 TABLET | Refills: 3 | Status: SHIPPED | OUTPATIENT
Start: 2025-01-29 | End: 2026-01-29

## 2025-01-29 NOTE — TELEPHONE ENCOUNTER
Rx Refill Request Telephone Encounter    Name:  Destiny Reddy  :  [id number]  Medication Name:  lisinopril  Dose : 5 mg  Directions : TAKE 1 TABLET BY MOUTH ONCE  DAILY    Medication Name:  rivaroxaban  Dose : 15 mg  Directions : Take 1 tablet (15 mg) by mouth once daily. Take with food.    Medication Name:  Empagliflozin  Dose : 10 mg  Directions : Take 1 tablet (10 mg) by mouth once daily.    Medication Name:  hydrochlorothiazide  Dose : 25 mg  Directions : TAKE 1 TABLET BY MOUTH DAILY    Medication Name:  metoprolol tartrate  Dose : 50 mg  Directions :  Take 1 tablet by mouth 2 times a day.    Medication Name:  citalopram  Dose : 20 mg  Directions :  Take 1 tablet (20 mg) by mouth once daily.    Medication Name:  gemfibrozil  Dose : 600 mg  Directions : Take 1 tablet (600 mg) by mouth once daily.  Specific Pharmacy location:  [facility] on file

## 2025-02-26 DIAGNOSIS — I48.19 PERSISTENT ATRIAL FIBRILLATION (MULTI): ICD-10-CM

## 2025-03-13 ENCOUNTER — APPOINTMENT (OUTPATIENT)
Dept: PRIMARY CARE | Facility: CLINIC | Age: 70
End: 2025-03-13
Payer: MEDICARE

## 2025-03-13 VITALS
BODY MASS INDEX: 40.34 KG/M2 | OXYGEN SATURATION: 98 % | WEIGHT: 257 LBS | HEART RATE: 92 BPM | SYSTOLIC BLOOD PRESSURE: 97 MMHG | HEIGHT: 67 IN | DIASTOLIC BLOOD PRESSURE: 68 MMHG

## 2025-03-13 DIAGNOSIS — R53.83 FATIGUE, UNSPECIFIED TYPE: ICD-10-CM

## 2025-03-13 DIAGNOSIS — E11.42 TYPE 2 DIABETES MELLITUS WITH DIABETIC POLYNEUROPATHY, WITHOUT LONG-TERM CURRENT USE OF INSULIN: ICD-10-CM

## 2025-03-13 DIAGNOSIS — Z12.11 SCREENING FOR COLON CANCER: ICD-10-CM

## 2025-03-13 DIAGNOSIS — Z00.00 ROUTINE GENERAL MEDICAL EXAMINATION AT HEALTH CARE FACILITY: Primary | ICD-10-CM

## 2025-03-13 DIAGNOSIS — Z00.00 MEDICARE ANNUAL WELLNESS VISIT, SUBSEQUENT: ICD-10-CM

## 2025-03-13 DIAGNOSIS — F41.1 GENERALIZED ANXIETY DISORDER: ICD-10-CM

## 2025-03-13 DIAGNOSIS — N18.31 CHRONIC KIDNEY DISEASE, STAGE 3A (MULTI): ICD-10-CM

## 2025-03-13 DIAGNOSIS — M85.89 OTHER SPECIFIED DISORDERS OF BONE DENSITY AND STRUCTURE, MULTIPLE SITES: ICD-10-CM

## 2025-03-13 DIAGNOSIS — I48.19 PERSISTENT ATRIAL FIBRILLATION (MULTI): ICD-10-CM

## 2025-03-13 DIAGNOSIS — N18.32 CHRONIC KIDNEY DISEASE, STAGE 3B (MULTI): ICD-10-CM

## 2025-03-13 DIAGNOSIS — I10 PRIMARY HYPERTENSION: ICD-10-CM

## 2025-03-13 DIAGNOSIS — Z12.31 ENCOUNTER FOR SCREENING MAMMOGRAM FOR MALIGNANT NEOPLASM OF BREAST: ICD-10-CM

## 2025-03-13 PROCEDURE — 1159F MED LIST DOCD IN RCRD: CPT | Performed by: INTERNAL MEDICINE

## 2025-03-13 PROCEDURE — 4010F ACE/ARB THERAPY RXD/TAKEN: CPT | Performed by: INTERNAL MEDICINE

## 2025-03-13 PROCEDURE — 1158F ADVNC CARE PLAN TLK DOCD: CPT | Performed by: INTERNAL MEDICINE

## 2025-03-13 PROCEDURE — G0439 PPPS, SUBSEQ VISIT: HCPCS | Performed by: INTERNAL MEDICINE

## 2025-03-13 PROCEDURE — 3074F SYST BP LT 130 MM HG: CPT | Performed by: INTERNAL MEDICINE

## 2025-03-13 PROCEDURE — 1123F ACP DISCUSS/DSCN MKR DOCD: CPT | Performed by: INTERNAL MEDICINE

## 2025-03-13 PROCEDURE — 1170F FXNL STATUS ASSESSED: CPT | Performed by: INTERNAL MEDICINE

## 2025-03-13 PROCEDURE — 3078F DIAST BP <80 MM HG: CPT | Performed by: INTERNAL MEDICINE

## 2025-03-13 PROCEDURE — G2211 COMPLEX E/M VISIT ADD ON: HCPCS | Performed by: INTERNAL MEDICINE

## 2025-03-13 PROCEDURE — 3008F BODY MASS INDEX DOCD: CPT | Performed by: INTERNAL MEDICINE

## 2025-03-13 PROCEDURE — 99214 OFFICE O/P EST MOD 30 MIN: CPT | Performed by: INTERNAL MEDICINE

## 2025-03-13 RX ORDER — CITALOPRAM 10 MG/1
10 TABLET ORAL DAILY
Qty: 90 TABLET | Refills: 3 | Status: SHIPPED | OUTPATIENT
Start: 2025-03-13 | End: 2026-03-13

## 2025-03-13 ASSESSMENT — ENCOUNTER SYMPTOMS
OCCASIONAL FEELINGS OF UNSTEADINESS: 0
LOSS OF SENSATION IN FEET: 0
DEPRESSION: 0

## 2025-03-13 ASSESSMENT — ACTIVITIES OF DAILY LIVING (ADL)
MANAGING_FINANCES: INDEPENDENT
DRESSING: INDEPENDENT
GROCERY_SHOPPING: INDEPENDENT
BATHING: INDEPENDENT
TAKING_MEDICATION: INDEPENDENT
DOING_HOUSEWORK: INDEPENDENT

## 2025-03-13 NOTE — PROGRESS NOTES
"Subjective   Reason for Visit: Destiny Reddy is an 70 y.o. female here for a Medicare Wellness visit and follow up.          Reviewed all medications by prescribing practitioner or clinical pharmacist (such as prescriptions, OTCs, herbal therapies and supplements) and documented in the medical record.    HPI    Tolerating jardiance  Weight down 12 pounds    But overall not feeling well, reports energy is down. Feels tired all the time. Doesn't do much. Doesn't leave house. Watches tv all day. Feet/ankles hurt. Legs feel weak.    Would like to lower dose of citalopram. Put on this in the past for some anxiety with work and is feeling better regarding this.    Not checking BP.    Tolerating xarelto, no bleeding issues.      Patient Care Team:  Buster Palacio MD as PCP - General (Internal Medicine)     Review of Systems    Objective   Vitals:  BP 97/68 (BP Location: Right arm, Patient Position: Sitting)   Pulse 92   Ht 1.689 m (5' 6.5\")   Wt 117 kg (257 lb)   SpO2 98%   BMI 40.86 kg/m²       Physical Exam  Constitutional:       Appearance: Normal appearance.   Eyes:      Extraocular Movements: Extraocular movements intact.      Pupils: Pupils are equal, round, and reactive to light.   Cardiovascular:      Rate and Rhythm: Normal rate and regular rhythm.      Heart sounds: No murmur heard.  Pulmonary:      Effort: Pulmonary effort is normal.      Breath sounds: Normal breath sounds.   Musculoskeletal:      Right lower leg: No edema.      Left lower leg: No edema.   Neurological:      General: No focal deficit present.      Mental Status: She is alert.      Cranial Nerves: No cranial nerve deficit.      Motor: Weakness (4/5 in LEs) present.      Gait: Gait abnormal (slow but steady).         Assessment & Plan  Routine general medical examination at health care facility         Medicare annual wellness visit, subsequent         Type 2 diabetes mellitus with diabetic polyneuropathy, without long-term current use of " insulin    Orders:    Comprehensive Metabolic Panel; Future    Lipid Panel; Future    Hemoglobin A1C; Future    Albumin-Creatinine Ratio, Urine Random; Future    Primary hypertension    Orders:    CBC; Future    Chronic kidney disease, stage 3a (Multi)         Fatigue, unspecified type    Orders:    Thyroid Stimulating Hormone; Future    Home sleep apnea test (HSAT); Future    Generalized anxiety disorder    Orders:    citalopram (CeleXA) 10 mg tablet; Take 1 tablet (10 mg) by mouth once daily.    Persistent atrial fibrillation (Multi)    Orders:    Home sleep apnea test (HSAT); Future    Encounter for screening mammogram for malignant neoplasm of breast    Orders:    BI mammo bilateral screening tomosynthesis; Future    Screening for colon cancer    Orders:    Colonoscopy Screening; Average Risk Patient; Future    Other specified disorders of bone density and structure, multiple sites    Orders:    XR DEXA bone density; Future    Chronic kidney disease, stage 3b (Multi)         Body mass index (BMI) 40.0-44.9, adult (Multi)                   DM - controlled  -Continue jardiance  -Sees Optho  -With neuropathy. Has seen Podiatry but wont be following up with them. She doesn't want any med for this.     HTN - stop hydrochlorothiazide      HLP - continue statin and gemfibrozil     CKD3 -stable     Anxiety - decrease citalopram     GERD  - continue PPI, referred to GI at prior visit     Afib    -Continue metoprolol   -Continue xarelto  -Ordered echo and sleep study at prior visit but never done. Discussed again today and recommended ordering. She is hesitant but ok with the sleep study, ordered. Refuses echo.    Fatigue  -BP low, stop hydrochlorothiazide  -Check labs  -Sleep study    LE weakness  -Recommended PT but she declines     Health maintenance:  -Colon cancer screening - overdue, never done, discussed and reordered.  -Mammogram - overdue, never done, discussed and reordered.  -Bone density - overdue, never done,  discussed and reordered.  -Immunizations    -Pneumonia - Prevnar 20 UTD   -Shingles - Shingrix UTD     Follow up 3 months

## 2025-03-13 NOTE — ASSESSMENT & PLAN NOTE
Orders:    Comprehensive Metabolic Panel; Future    Lipid Panel; Future    Hemoglobin A1C; Future    Albumin-Creatinine Ratio, Urine Random; Future

## 2025-03-14 LAB
ALBUMIN SERPL-MCNC: 4 G/DL (ref 3.6–5.1)
ALBUMIN/CREAT UR: 20 MG/G CREAT
ALP SERPL-CCNC: 109 U/L (ref 37–153)
ALT SERPL-CCNC: 7 U/L (ref 6–29)
ANION GAP SERPL CALCULATED.4IONS-SCNC: 15 MMOL/L (CALC) (ref 7–17)
AST SERPL-CCNC: 11 U/L (ref 10–35)
BILIRUB SERPL-MCNC: 0.4 MG/DL (ref 0.2–1.2)
BUN SERPL-MCNC: 27 MG/DL (ref 7–25)
CALCIUM SERPL-MCNC: 9.7 MG/DL (ref 8.6–10.4)
CHLORIDE SERPL-SCNC: 104 MMOL/L (ref 98–110)
CHOLEST SERPL-MCNC: 171 MG/DL
CHOLEST/HDLC SERPL: 2.9 (CALC)
CO2 SERPL-SCNC: 21 MMOL/L (ref 20–32)
CREAT SERPL-MCNC: 1.73 MG/DL (ref 0.6–1)
CREAT UR-MCNC: 162 MG/DL (ref 20–275)
EGFRCR SERPLBLD CKD-EPI 2021: 31 ML/MIN/1.73M2
ERYTHROCYTE [DISTWIDTH] IN BLOOD BY AUTOMATED COUNT: 13 % (ref 11–15)
EST. AVERAGE GLUCOSE BLD GHB EST-MCNC: 217 MG/DL
EST. AVERAGE GLUCOSE BLD GHB EST-SCNC: 12 MMOL/L
GLUCOSE SERPL-MCNC: 222 MG/DL (ref 65–99)
HBA1C MFR BLD: 9.2 % OF TOTAL HGB
HCT VFR BLD AUTO: 44.5 % (ref 35–45)
HDLC SERPL-MCNC: 60 MG/DL
HGB BLD-MCNC: 14.9 G/DL (ref 11.7–15.5)
LDLC SERPL CALC-MCNC: 87 MG/DL (CALC)
MCH RBC QN AUTO: 28.6 PG (ref 27–33)
MCHC RBC AUTO-ENTMCNC: 33.5 G/DL (ref 32–36)
MCV RBC AUTO: 85.4 FL (ref 80–100)
MICROALBUMIN UR-MCNC: 3.2 MG/DL
NONHDLC SERPL-MCNC: 111 MG/DL (CALC)
PLATELET # BLD AUTO: 283 THOUSAND/UL (ref 140–400)
PMV BLD REES-ECKER: 10.2 FL (ref 7.5–12.5)
POTASSIUM SERPL-SCNC: 5.2 MMOL/L (ref 3.5–5.3)
PROT SERPL-MCNC: 6.7 G/DL (ref 6.1–8.1)
RBC # BLD AUTO: 5.21 MILLION/UL (ref 3.8–5.1)
SODIUM SERPL-SCNC: 140 MMOL/L (ref 135–146)
TRIGL SERPL-MCNC: 141 MG/DL
TSH SERPL-ACNC: 1.95 MIU/L (ref 0.4–4.5)
WBC # BLD AUTO: 9.9 THOUSAND/UL (ref 3.8–10.8)

## 2025-03-17 DIAGNOSIS — N18.31 CHRONIC KIDNEY DISEASE, STAGE 3A (MULTI): Primary | ICD-10-CM

## 2025-03-19 ENCOUNTER — TELEPHONE (OUTPATIENT)
Dept: PRIMARY CARE | Facility: CLINIC | Age: 70
End: 2025-03-19
Payer: MEDICARE

## 2025-03-19 DIAGNOSIS — E11.42 TYPE 2 DIABETES MELLITUS WITH DIABETIC POLYNEUROPATHY, WITHOUT LONG-TERM CURRENT USE OF INSULIN: ICD-10-CM

## 2025-03-19 NOTE — TELEPHONE ENCOUNTER
Dr Palacio, patient said absolutely no injections!!!  She will however get the lab work done again        A1C jumped up to 9.2. We want this <7. I recommend adding once weekly injection mounjaro. This should get the sugars down and can help with weight loss. She needs to get more active also.   Also, creatinine level increased a little to 1.7. She should make sure she is hydrating well and I ordered a repeat lab for this to be done later this week.

## 2025-03-19 NOTE — TELEPHONE ENCOUNTER
----- Message from Buster Palacio sent at 3/17/2025  9:40 AM EDT -----  A1C jumped up to 9.2. We want this <7. I recommend adding once weekly injection mounjaro. This should get the sugars down and can help with weight loss. She needs to get more active also.  Also, creatinine level increased a little to 1.7. She should make sure she is hydrating well and I ordered a repeat lab for this to be done later this week.

## 2025-03-20 NOTE — TELEPHONE ENCOUNTER
Spoke to patient : per Dr Palacio   Ok I sent a higher dose of jardiance in for her. She needs to watch the sugars/carbs in the diet and be more physically active and try for some weight loss.     Patient understood and will get bloodwork done next week

## 2025-03-20 NOTE — TELEPHONE ENCOUNTER
Ok I sent a higher dose of jardiance in for her. She needs to watch the sugars/carbs in the diet and be more physically active and try for some weight loss.

## 2025-03-21 ENCOUNTER — TELEPHONE (OUTPATIENT)
Dept: PRIMARY CARE | Facility: CLINIC | Age: 70
End: 2025-03-21
Payer: MEDICARE

## 2025-03-21 NOTE — TELEPHONE ENCOUNTER
Patient called asking if you would give her a call back. She has questions about her medications. Please advise.

## 2025-03-25 ENCOUNTER — TELEPHONE (OUTPATIENT)
Dept: PRIMARY CARE | Facility: CLINIC | Age: 70
End: 2025-03-25
Payer: MEDICARE

## 2025-03-25 DIAGNOSIS — E11.42 TYPE 2 DIABETES MELLITUS WITH DIABETIC POLYNEUROPATHY, WITHOUT LONG-TERM CURRENT USE OF INSULIN: Primary | ICD-10-CM

## 2025-03-25 NOTE — TELEPHONE ENCOUNTER
She should continue on the higher jardiance for now. If she is serious about getting her sugar controlled, then she really needs to consider the injection - this is by far the best option. Otherwise take the higher jardiance and really focus on increased exercise and a lower sugar and carb diet.

## 2025-03-25 NOTE — TELEPHONE ENCOUNTER
Spoke to patient, she wants to know if she can get ozempic, if so she wants the patch and not the shot

## 2025-03-25 NOTE — TELEPHONE ENCOUNTER
Lily, this patient wants to talk to you, states she called on last   Friday about an injection for weight loss

## 2025-03-25 NOTE — TELEPHONE ENCOUNTER
There are no patches for these meds, only injections. Its very simple. I recommend doing the injection and I would recommend Mounjaro over Ozempic. I can send it in if she is ok with it.

## 2025-03-26 ENCOUNTER — TELEPHONE (OUTPATIENT)
Dept: PRIMARY CARE | Facility: CLINIC | Age: 70
End: 2025-03-26
Payer: MEDICARE

## 2025-03-26 RX ORDER — TIRZEPATIDE 2.5 MG/.5ML
2.5 INJECTION, SOLUTION SUBCUTANEOUS WEEKLY
Qty: 2 ML | Refills: 0 | Status: SHIPPED | OUTPATIENT
Start: 2025-03-26

## 2025-03-26 NOTE — TELEPHONE ENCOUNTER
Spoke to patient to follow up : per Dr Palacio   I sent Mounjaro, as this is better than ozempic. I also placed a Pharmacy referral so they can help titrate monthly until her next visit.

## 2025-03-26 NOTE — TELEPHONE ENCOUNTER
I sent Mounjaro, as this is better than ozempic. I also placed a Pharmacy referral so they can help titrate monthly until her next visit.

## 2025-03-28 NOTE — PROGRESS NOTES
Clinical Pharmacy Appointment    Patient ID: Destiny Reddy is a 70 y.o. female who presents for No chief complaint on file..    Pt is here for Follow Up appointment.     Referring Provider: Buster Palacio MD  PCP: Buster Palacio MD   Last visit with PCP: 3/13/2025   Next visit with PCP: 6/13/2025    Subjective   Medication Reconciliation:  Changed: ***  Added: ***  Discontinued: ***    Drug Interactions  {Drug Interactions:46225}    Medication System Management  Patient's preferred pharmacy: ***  Adherence/Organization: ***  Affordability/Accessibility: [facility] until 10/1/2025    HPI  Type II Diabetes  Current  Pharmacotherapy:   Jardiance 25 mg daily (started after 3/20/25)  Mounjaro 2.5 mg once weekly (ordered 3/26/25)     SECONDARY PREVENTION  - Statin? Pravastatin 10 mg daily   LDL:   TC:  - ACE-I/ARB? Lisinopril 5 mg daily  ALBUMIN/CREATININE RATIO, RANDOM URINE   Date Value Ref Range Status   03/13/2025 20 <30 mg/g creat Final     Comment:        The ADA defines abnormalities in albumin  excretion as follows:     Albuminuria Category        Result (mg/g creatinine)     Normal to Mildly increased   <30  Moderately increased            Severely increased           > OR = 300     The ADA recommends that at least two of three  specimens collected within a 3-6 month period be  abnormal before considering a patient to be  within a diagnostic category.       Albumin/Creatinine Ratio   Date Value Ref Range Status   02/27/2024 44.8 (H) <30.0 ug/mg Creat Final     Albumin/Creatine Ratio   Date Value Ref Range Status   06/13/2023 13.3 0.0 - 30.0 ug/mg crt Final     - Aspirin? no       Current monitoring regimen:   Patient is using: glucometer     SMBG Fasting Readings:     Hypoglycemia?    Pertinent PMH Review:  - PMH of Pancreatitis: ***  - PMH/FH of Medullary Thyroid Cancer: ***  - PMH of Retinopathy: ***  - PMH of Urinary Tract Infections: ***    Complications:  - ALETHEA: ***  - CKD: stage 3a    -The 10-year  ASCVD risk score (Raymundo KHAN, et al., 2019) is: 14.6%    Values used to calculate the score:      Age: 70 years      Sex: Female      Is Non- : No      Diabetic: Yes      Tobacco smoker: No      Systolic Blood Pressure: 97 mmHg      Is BP treated: Yes      HDL Cholesterol: 60 mg/dL      Total Cholesterol: 171 mg/dL      Diet/Lifestyle:           Objective   Allergies   Allergen Reactions    Other Unknown     Sugar water/IV     Social History     Social History Narrative    Not on file      Medication Review  Current Outpatient Medications   Medication Instructions    cholecalciferol (Vitamin D-3) 5,000 Units tablet oral    citalopram (CELEXA) 10 mg, oral, Daily    empagliflozin (JARDIANCE) 25 mg, oral, Daily    gemfibrozil (LOPID) 600 mg, oral, Daily    glucose 4 gram chewable tablet oral    lancets 33 gauge misc miscellaneous    lisinopril 5 mg, oral, Daily    metoprolol tartrate (LOPRESSOR) 50 mg, oral, 2 times daily    Mounjaro 2.5 mg, subcutaneous, Weekly    naproxen sodium (ALEVE) 220 mg, oral, 2 times daily    omeprazole (PriLOSEC) 40 mg DR capsule 1 capsule, oral, Daily    OneTouch Delica Plus Lancet 33 gauge misc TEST ONCE DAILY    OneTouch Ultra Test strip USE TO CHECK BLOOD SUGAR DAILY    OneTouch Ultra2 Meter misc     pravastatin (Pravachol) 10 mg tablet 1 tablet, oral, Daily    rivaroxaban (XARELTO) 15 mg, oral, Daily, Take with food.    vit C-s.cherry-celery-grape sd (Tart Cherry) -57-75-20 mg capsule 1 capsule, oral, Daily      Vitals  BP Readings from Last 2 Encounters:   03/13/25 97/68   09/12/24 121/86     BMI Readings from Last 1 Encounters:   03/13/25 40.86 kg/m²      Labs  A1C  Lab Results   Component Value Date    HGBA1C 9.2 (H) 03/13/2025    HGBA1C 5.9 (H) 09/12/2024    HGBA1C 5.9 (H) 02/27/2024     BMP  Lab Results   Component Value Date    CALCIUM 9.7 03/13/2025     03/13/2025    K 5.2 03/13/2025    CO2 21 03/13/2025     03/13/2025    BUN 27 (H)  03/13/2025    CREATININE 1.73 (H) 03/13/2025    EGFR 31 (L) 03/13/2025     LFTs  Lab Results   Component Value Date    ALT 7 03/13/2025    AST 11 03/13/2025    ALKPHOS 109 03/13/2025    BILITOT 0.4 03/13/2025     FLP  Lab Results   Component Value Date    TRIG 141 03/13/2025    CHOL 171 03/13/2025    LDLF 102 (H) 06/13/2023    LDLCALC 87 03/13/2025    HDL 60 03/13/2025     Urine Microalbumin  Lab Results   Component Value Date    MICROALBCREA 20 03/13/2025     Weight Management  Wt Readings from Last 3 Encounters:   03/13/25 117 kg (257 lb)   09/12/24 122 kg (269 lb)   02/27/24 122 kg (270 lb)      There is no height or weight on file to calculate BMI.     Assessment/Plan   Problem List Items Addressed This Visit    None    Patient's goal A1c is < 7%.  Is pt at goal? No, 9.2%  Patient's SMBGs are ***.     Rationale for plan: ***.    Medication Changes:  CONTINUE  Jardiance 25 mg daily  Mounjaro 2.5 mg once weekly  STOP    START    INCREASE    DECREASE      Future Considerations:      Monitoring and Education:      Clinical Pharmacist follow-up: ***, Telehealth visit      Continue all meds under the continuation of care with the referring provider and clinical pharmacy team.    Jose Wilkinson PharmD    Verbal consent to manage patient's drug therapy was obtained from the patient. They were informed they may decline to participate or withdraw from participation in pharmacy services at any time.     minimize based on how the medication works to lower blood glucose level.    Clinical Pharmacist follow-up: 5/5/2025 @1020, Telehealth visit    Continue all meds under the continuation of care with the referring provider and clinical pharmacy team.    Jose Wilkinson PharmD    Verbal consent to manage patient's drug therapy was obtained from the patient. They were informed they may decline to participate or withdraw from participation in pharmacy services at any time.

## 2025-04-07 ENCOUNTER — PROCEDURE VISIT (OUTPATIENT)
Dept: SLEEP MEDICINE | Facility: CLINIC | Age: 70
End: 2025-04-07
Payer: MEDICARE

## 2025-04-07 ENCOUNTER — TELEPHONE (OUTPATIENT)
Dept: PRIMARY CARE | Facility: CLINIC | Age: 70
End: 2025-04-07

## 2025-04-07 DIAGNOSIS — R53.83 FATIGUE, UNSPECIFIED TYPE: ICD-10-CM

## 2025-04-07 DIAGNOSIS — I48.19 PERSISTENT ATRIAL FIBRILLATION (MULTI): ICD-10-CM

## 2025-04-07 NOTE — PROGRESS NOTES
Type of Study: HOME SLEEP STUDY - NOMAD     The patient received equipment and instructions for use of the [employer] [facility] Nomad HSAT device. The patient was instructed how to apply the effort belts, cannula, thermistor. It was also explained how the Nomad and oximeter components work.  The patient was asked to record their sleep for an 8-hour period.     The patient was informed of their responsibility for the device and acknowledged this by signing the HSAT device contract. The patient was asked to return the device on 4/8/2025 between the hours of 6:30 AM- 6:30 PM to the [facility].     The patient was instructed to call 911 as usual for any medical- emergencies while at home.  The patient was also given a phone number for troubleshooting when using the device in case there were additional questions.

## 2025-04-14 ENCOUNTER — APPOINTMENT (OUTPATIENT)
Dept: PHARMACY | Facility: HOSPITAL | Age: 70
End: 2025-04-14
Payer: MEDICARE

## 2025-04-14 DIAGNOSIS — E78.2 MIXED HYPERLIPIDEMIA: ICD-10-CM

## 2025-04-14 DIAGNOSIS — E11.42 TYPE 2 DIABETES MELLITUS WITH DIABETIC POLYNEUROPATHY, WITHOUT LONG-TERM CURRENT USE OF INSULIN: ICD-10-CM

## 2025-04-14 PROCEDURE — RXMED WILLOW AMBULATORY MEDICATION CHARGE

## 2025-04-14 RX ORDER — LANCETS 30 GAUGE
EACH MISCELLANEOUS
Qty: 1 EACH | Refills: 1 | Status: SHIPPED | OUTPATIENT
Start: 2025-04-14

## 2025-04-14 RX ORDER — LANCETS 33 GAUGE
EACH MISCELLANEOUS
Qty: 100 EACH | Refills: 2 | Status: SHIPPED | OUTPATIENT
Start: 2025-04-14

## 2025-04-14 RX ORDER — BLOOD SUGAR DIAGNOSTIC
STRIP MISCELLANEOUS
Qty: 100 STRIP | Refills: 2 | Status: SHIPPED | OUTPATIENT
Start: 2025-04-14

## 2025-04-14 RX ORDER — GEMFIBROZIL 600 MG/1
600 TABLET, FILM COATED ORAL DAILY
Qty: 90 TABLET | Refills: 3 | Status: SHIPPED | OUTPATIENT
Start: 2025-04-14

## 2025-04-16 ENCOUNTER — PHARMACY VISIT (OUTPATIENT)
Dept: PHARMACY | Facility: CLINIC | Age: 70
End: 2025-04-16
Payer: MEDICARE

## 2025-04-16 LAB
NON-UH HIE BUN/CREAT RATIO: 13.3
NON-UH HIE BUN: 20 MG/DL (ref 9–23)
NON-UH HIE CALCIUM: 9.8 MG/DL (ref 8.7–10.4)
NON-UH HIE CALCULATED OSMOLALITY: 288 MOSM/KG (ref 275–295)
NON-UH HIE CHLORIDE: 105 MMOL/L (ref 98–107)
NON-UH HIE CO2, VENOUS: 27 MMOL/L (ref 20–31)
NON-UH HIE CREATININE: 1.5 MG/DL (ref 0.5–0.8)
NON-UH HIE GFR AA: 41
NON-UH HIE GLOMERULAR FILTRATION RATE: 34 ML/MIN/1.73M?
NON-UH HIE GLUCOSE: 170 MG/DL (ref 74–106)
NON-UH HIE K: 4.9 MMOL/L (ref 3.5–5.1)
NON-UH HIE NA: 141 MMOL/L (ref 135–145)

## 2025-04-21 ENCOUNTER — TELEPHONE (OUTPATIENT)
Dept: PRIMARY CARE | Facility: CLINIC | Age: 70
End: 2025-04-21
Payer: MEDICARE

## 2025-04-21 NOTE — TELEPHONE ENCOUNTER
Dr Palacio spoke to patient to give results she does not want to us the CPAP          Spoke to patient to give results : per Dr Palacio Sleep study shows severe sleep apnea, which can contribute to causing afib. I recommend treating this with a CPAP, if she agrees I can order.     Patient understood and refused the CPAP

## 2025-04-21 NOTE — TELEPHONE ENCOUNTER
----- Message from Buster Palacio sent at 4/17/2025  8:09 AM EDT -----  Sleep study shows severe sleep apnea, which can contribute to causing afib. I recommend treating this with a CPAP, if she agrees I can order.  ----- Message -----  From: Sal Newell - Lab Results In  Sent: 4/15/2025   1:50 PM EDT  To: Buster Palacio MD

## 2025-04-22 NOTE — TELEPHONE ENCOUNTER
----- Message from Buster Palacio sent at 4/17/2025  8:17 AM EDT -----  Blood work ok, kidney function back down to normal range.  ----- Message -----  From: Neelam Newell - Lab Results In  Sent: 4/16/2025   4:04 PM EDT  To: Buster Palacio MD

## 2025-04-29 PROCEDURE — RXMED WILLOW AMBULATORY MEDICATION CHARGE

## 2025-04-29 RX ORDER — TIRZEPATIDE 5 MG/.5ML
5 INJECTION, SOLUTION SUBCUTANEOUS WEEKLY
Qty: 2 ML | Refills: 3 | Status: SHIPPED | OUTPATIENT
Start: 2025-04-29

## 2025-04-29 NOTE — PROGRESS NOTES
Clinical Pharmacy Appointment    Patient ID: Destiny Reddy is a 70 y.o. female who presents for Diabetes.    Pt is here for Follow Up appointment.     Referring Provider: Buster Palacio MD  PCP: Buster Palacio MD   Last visit with PCP: 3/13/2025   Next visit with PCP: 6/13/2025    Subjective   Drug Interactions  No relevant drug interactions were noted.    Medication System Management  Patient's preferred pharmacy:  Pharmacy  Adherence/Organization: no concerns  Affordability/Accessibility:  [facility] until 10/1/2025    HPI  Type II Diabetes  Current  Pharmacotherapy:   Jardiance 25 mg daily (started after 3/20/25)  Mounjaro 2.5 mg once weekly (ordered 3/26/25)     SECONDARY PREVENTION  - Statin? Pravastatin 10 mg daily   LDL: 87   TC:  - ACE-I/ARB? Lisinopril 5 mg daily  ALBUMIN/CREATININE RATIO, RANDOM URINE   Date Value Ref Range Status   03/13/2025 20 <30 mg/g creat Final     Comment:        The ADA defines abnormalities in albumin  excretion as follows:     Albuminuria Category        Result (mg/g creatinine)     Normal to Mildly increased   <30  Moderately increased            Severely increased           > OR = 300     The ADA recommends that at least two of three  specimens collected within a 3-6 month period be  abnormal before considering a patient to be  within a diagnostic category.       Albumin/Creatinine Ratio   Date Value Ref Range Status   02/27/2024 44.8 (H) <30.0 ug/mg Creat Final     Albumin/Creatine Ratio   Date Value Ref Range Status   06/13/2023 13.3 0.0 - 30.0 ug/mg crt Final     - Aspirin? no    Current monitoring regimen:   Patient is using: glucometer     SMBG Fasting Readings: **ask to test 3-4 days per week**    Hypoglycemia? None reported    Complications:  - ALETHEA: pending sleep study  - CKD: stage 3a    -The 10-year ASCVD risk score (Raymundo DK, et al., 2019) is: 14.6%    Values used to calculate the score:      Age: 70 years      Sex: Female      Is Non-   American: No      Diabetic: Yes      Tobacco smoker: No      Systolic Blood Pressure: 97 mmHg      Is BP treated: Yes      HDL Cholesterol: 60 mg/dL      Total Cholesterol: 171 mg/dL      Diet/Lifestyle:   Not discussed this visit due to time    Objective   Allergies   Allergen Reactions    Other Unknown     Sugar water/IV     Social History     Social History Narrative    Not on file      Medication Review  Current Outpatient Medications   Medication Instructions    blood sugar diagnostic (OneTouch Ultra Test) Use to test blood glucose once daily    cholecalciferol (Vitamin D-3) 5,000 Units tablet oral    citalopram (CELEXA) 10 mg, oral, Daily    empagliflozin (JARDIANCE) 25 mg, oral, Daily    gemfibrozil (LOPID) 600 mg, oral, Daily    glucose 4 gram chewable tablet oral    lancets (OneTouch Delica Plus Lancet) 33 gauge misc Use to test blood glucose once daily    lisinopril 5 mg, oral, Daily    metoprolol tartrate (LOPRESSOR) 50 mg, oral, 2 times daily    Mounjaro 5 mg, subcutaneous, Weekly    naproxen sodium (ALEVE) 220 mg, oral, 2 times daily    omeprazole (PriLOSEC) 40 mg DR capsule 1 capsule, oral, Daily    OneTouch Ultra2 Meter misc Use to test blood glucose once daily    pravastatin (Pravachol) 10 mg tablet 1 tablet, oral, Daily    rivaroxaban (XARELTO) 15 mg, oral, Daily, Take with food.    vit C-s.cherry-celery-grape sd (Tart Cherry) -35-75-20 mg capsule 1 capsule, oral, Daily      Vitals  BP Readings from Last 2 Encounters:   03/13/25 97/68   09/12/24 121/86     BMI Readings from Last 1 Encounters:   03/13/25 40.86 kg/m²      Labs  A1C  Lab Results   Component Value Date    HGBA1C 9.2 (H) 03/13/2025    HGBA1C 5.9 (H) 09/12/2024    HGBA1C 5.9 (H) 02/27/2024     BMP  Lab Results   Component Value Date    CALCIUM 9.7 03/13/2025     03/13/2025    K 5.2 03/13/2025    CO2 21 03/13/2025     03/13/2025    BUN 27 (H) 03/13/2025    CREATININE 1.73 (H) 03/13/2025    EGFR 31 (L) 03/13/2025      LFTs  Lab Results   Component Value Date    ALT 7 03/13/2025    AST 11 03/13/2025    ALKPHOS 109 03/13/2025    BILITOT 0.4 03/13/2025     FLP  Lab Results   Component Value Date    TRIG 141 03/13/2025    CHOL 171 03/13/2025    LDLF 102 (H) 06/13/2023    LDLCALC 87 03/13/2025    HDL 60 03/13/2025     Urine Microalbumin  Lab Results   Component Value Date    MICROALBCREA 20 03/13/2025     Weight Management  Wt Readings from Last 3 Encounters:   03/13/25 117 kg (257 lb)   09/12/24 122 kg (269 lb)   02/27/24 122 kg (270 lb)      There is no height or weight on file to calculate BMI.     Assessment/Plan   Problem List Items Addressed This Visit       Type 2 diabetes mellitus with diabetic polyneuropathy, without long-term current use of insulin    Relevant Medications    tirzepatide (Mounjaro) 5 mg/0.5 mL pen injector    Other Relevant Orders    Referral to Clinical Pharmacy     Patient's goal A1c is < 7%.  Is pt at goal? No, 9.2%  Patient's SMBGs are **not currently testing.   Patient has received new testing supplies.  Rationale for plan: Patient uncontrolled with recent A1c of 9.2%. Patient not testing blood glucose regularly at home. Patient tolerated Jardiance 25 mg daily. Patient tolerating Mounjaro 2.5 mg once weekly after 3 doses. Plan to increase Mounjaro to 5 mg once weekly and follow up in 4 weeks to assess FBG readings.    Discussed testing blood glucose in morning at least 3-4 times per week.     Patient approved [facility] until 10/1/2025.    Medication Changes:  CONTINUE  Jardiance 25 mg daily    INCREASE  Mounjaro 2.5 mg to 5 mg once weekly    Future Considerations:  Increase Mounjaro as tolerated for glycemic control.    Monitoring and Education:  Discussed the titration schedule of Mounjaro. Discussed side effects and how to minimize based on how the medication works to lower blood glucose level.    Clinical Pharmacist follow-up: 5/27/2025 @1020, Telehealth visit    Continue all meds under the  continuation of care with the referring provider and clinical pharmacy team.    Jose Wilkinson PharmD    Verbal consent to manage patient's drug therapy was obtained from the patient. They were informed they may decline to participate or withdraw from participation in pharmacy services at any time.

## 2025-05-01 ENCOUNTER — PHARMACY VISIT (OUTPATIENT)
Dept: PHARMACY | Facility: CLINIC | Age: 70
End: 2025-05-01
Payer: MEDICARE

## 2025-05-05 ENCOUNTER — APPOINTMENT (OUTPATIENT)
Dept: PHARMACY | Facility: HOSPITAL | Age: 70
End: 2025-05-05
Payer: MEDICARE

## 2025-05-05 DIAGNOSIS — E11.42 TYPE 2 DIABETES MELLITUS WITH DIABETIC POLYNEUROPATHY, WITHOUT LONG-TERM CURRENT USE OF INSULIN: ICD-10-CM

## 2025-05-21 NOTE — PROGRESS NOTES
Clinical Pharmacy Appointment    Patient ID: Destiny Reddy is a 70 y.o. female who presents for Diabetes.    Pt is here for Follow Up appointment.     Referring Provider: Buster Palacio MD  PCP: Buster Palacio MD   Last visit with PCP: 3/13/2025  Next visit with PCP: 6/13/2025    Subjective   Drug Interactions  No relevant drug interactions were noted.    Medication System Management  Patient's preferred pharmacy:  Pharmacy  Adherence/Organization: no concerns  Affordability/Accessibility:  [employer] until 10/1/2025    HPI  Type II Diabetes  Current  Pharmacotherapy:   Jardiance 25 mg daily (started after 3/20/25)  Mounjaro 5 mg once weekly (ordered 3/26/25)     SECONDARY PREVENTION  - Statin? Pravastatin 10 mg daily   LDL: 87   TC:  - ACE-I/ARB? Lisinopril 5 mg daily  ALBUMIN/CREATININE RATIO, RANDOM URINE   Date Value Ref Range Status   03/13/2025 20 <30 mg/g creat Final     Comment:        The ADA defines abnormalities in albumin  excretion as follows:     Albuminuria Category        Result (mg/g creatinine)     Normal to Mildly increased   <30  Moderately increased            Severely increased           > OR = 300     The ADA recommends that at least two of three  specimens collected within a 3-6 month period be  abnormal before considering a patient to be  within a diagnostic category.       Albumin/Creatinine Ratio   Date Value Ref Range Status   02/27/2024 44.8 (H) <30.0 ug/mg Creat Final     Albumin/Creatine Ratio   Date Value Ref Range Status   06/13/2023 13.3 0.0 - 30.0 ug/mg crt Final     - Aspirin? no    Current monitoring regimen:   Patient is using: glucometer     SMBG Fasting Readings: **ask to test 3-4 days per week**    Hypoglycemia? None reported    Complications:  - ALETHEA: pending sleep study  - CKD: stage 3a    -The 10-year ASCVD risk score (Raymundo KHAN, et al., 2019) is: 14.6%    Values used to calculate the score:      Age: 70 years      Sex: Female      Is Non-   Vital signs stable. Bottle fed in the nursery during the night. Taking 20-25ml of sim, tolerating well. Age appropriate voids and stools.  Will continue to monitor.    American: No      Diabetic: Yes      Tobacco smoker: No      Systolic Blood Pressure: 97 mmHg      Is BP treated: Yes      HDL Cholesterol: 60 mg/dL      Total Cholesterol: 171 mg/dL      Diet/Lifestyle:   Not discussed this visit due to time    Objective   Allergies   Allergen Reactions    Other Unknown     Sugar water/IV     Social History     Social History Narrative    Not on file      Medication Review  Current Outpatient Medications   Medication Instructions    blood sugar diagnostic (OneTouch Ultra Test) Use to test blood glucose once daily    cholecalciferol (Vitamin D-3) 5,000 Units tablet oral    citalopram (CELEXA) 10 mg, oral, Daily    empagliflozin (JARDIANCE) 25 mg, oral, Daily    gemfibrozil (LOPID) 600 mg, oral, Daily    glucose 4 gram chewable tablet oral    lancets (OneTouch Delica Plus Lancet) 33 gauge misc Use to test blood glucose once daily    lisinopril 5 mg, oral, Daily    metoprolol tartrate (LOPRESSOR) 50 mg, oral, 2 times daily    Mounjaro 7.5 mg, subcutaneous, Weekly    naproxen sodium (ALEVE) 220 mg, oral, 2 times daily    omeprazole (PriLOSEC) 40 mg DR capsule 1 capsule, oral, Daily    OneTouch Ultra2 Meter misc Use to test blood glucose once daily    pravastatin (Pravachol) 10 mg tablet 1 tablet, oral, Daily    rivaroxaban (XARELTO) 15 mg, oral, Daily, Take with food.    vit C-s.cherry-celery-grape sd (Tart Cherry) -04-75-20 mg capsule 1 capsule, oral, Daily      Vitals  BP Readings from Last 2 Encounters:   03/13/25 97/68   09/12/24 121/86     BMI Readings from Last 1 Encounters:   03/13/25 40.86 kg/m²      Labs  A1C  Lab Results   Component Value Date    HGBA1C 9.2 (H) 03/13/2025    HGBA1C 5.9 (H) 09/12/2024    HGBA1C 5.9 (H) 02/27/2024     BMP  Lab Results   Component Value Date    CALCIUM 9.7 03/13/2025     03/13/2025    K 5.2 03/13/2025    CO2 21 03/13/2025     03/13/2025    BUN 27 (H) 03/13/2025    CREATININE 1.73 (H) 03/13/2025    EGFR 31 (L) 03/13/2025      LFTs  Lab Results   Component Value Date    ALT 7 03/13/2025    AST 11 03/13/2025    ALKPHOS 109 03/13/2025    BILITOT 0.4 03/13/2025     FLP  Lab Results   Component Value Date    TRIG 141 03/13/2025    CHOL 171 03/13/2025    LDLF 102 (H) 06/13/2023    LDLCALC 87 03/13/2025    HDL 60 03/13/2025     Urine Microalbumin  Lab Results   Component Value Date    MICROALBCREA 20 03/13/2025     Weight Management  Wt Readings from Last 3 Encounters:   03/13/25 117 kg (257 lb)   09/12/24 122 kg (269 lb)   02/27/24 122 kg (270 lb)      There is no height or weight on file to calculate BMI.     Assessment/Plan   Problem List Items Addressed This Visit       Type 2 diabetes mellitus with diabetic polyneuropathy, without long-term current use of insulin    Relevant Medications    tirzepatide (Mounjaro) 7.5 mg/0.5 mL pen injector    Other Relevant Orders    Referral to Clinical Pharmacy     Patient's goal A1c is < 7%.  Is pt at goal? No, 9.2%  Patient's SMBGs are **not currently testing. Patient states not opposed to testing BG but having difficulty with new glucometer. New glucometer is set up and asks to apply blood. Once patient applies blood the meter does not read leading to screen timing out. Discussed patient can bring meter to provider's office to help troubleshoot.     Rationale for plan: Patient uncontrolled with recent A1c of 9.2%. Patient tolerated Jardiance 25 mg daily. Patient tolerating Mounjaro 5 mg once weekly after 3 doses. Plan to increase Mounjaro to 7.5 mg once weekly and follow up in 4 weeks to assess FBG readings. Discussed weight loss goals with Mounjaro. Discussed weight loss can directly benefit glycemic control which is the primary goal.    Patient to follow up with PCP on 6/13/2025 for updated A1c.    Discussed testing blood glucose in morning at least 3-4 times per week.     Patient approved [facility] until 10/1/2025.    Medication Changes:  CONTINUE  Jardiance 25 mg daily    INCREASE  Mounjaro 5  mg to 7.5 mg once weekly    Future Considerations:  Increase Mounjaro as tolerated for glycemic control and weight loss benefit.    Monitoring and Education:  Discussed the titration schedule of Mounjaro. Discussed side effects and how to minimize based on how the medication works to lower blood glucose level.    Clinical Pharmacist follow-up: 6/24/2025 @1020, Telehealth visit    Continue all meds under the continuation of care with the referring provider and clinical pharmacy team.    Jose Wilkinson PharmD    Verbal consent to manage patient's drug therapy was obtained from the patient. They were informed they may decline to participate or withdraw from participation in pharmacy services at any time.

## 2025-05-27 ENCOUNTER — APPOINTMENT (OUTPATIENT)
Dept: PHARMACY | Facility: HOSPITAL | Age: 70
End: 2025-05-27
Payer: MEDICARE

## 2025-05-27 DIAGNOSIS — E11.42 TYPE 2 DIABETES MELLITUS WITH DIABETIC POLYNEUROPATHY, WITHOUT LONG-TERM CURRENT USE OF INSULIN: ICD-10-CM

## 2025-05-27 RX ORDER — TIRZEPATIDE 7.5 MG/.5ML
7.5 INJECTION, SOLUTION SUBCUTANEOUS WEEKLY
Qty: 2 ML | Refills: 3 | Status: SHIPPED | OUTPATIENT
Start: 2025-05-27

## 2025-05-28 DIAGNOSIS — E11.42 TYPE 2 DIABETES MELLITUS WITH DIABETIC POLYNEUROPATHY, WITHOUT LONG-TERM CURRENT USE OF INSULIN: ICD-10-CM

## 2025-05-28 DIAGNOSIS — I48.19 PERSISTENT ATRIAL FIBRILLATION (MULTI): ICD-10-CM

## 2025-05-28 RX ORDER — LISINOPRIL 5 MG/1
5 TABLET ORAL DAILY
Qty: 90 TABLET | Refills: 3 | Status: SHIPPED | OUTPATIENT
Start: 2025-05-28

## 2025-05-28 RX ORDER — METOPROLOL TARTRATE 50 MG/1
50 TABLET ORAL 2 TIMES DAILY
Qty: 180 TABLET | Refills: 3 | Status: SHIPPED | OUTPATIENT
Start: 2025-05-28 | End: 2026-05-28

## 2025-05-30 ENCOUNTER — TELEPHONE (OUTPATIENT)
Dept: PRIMARY CARE | Facility: CLINIC | Age: 70
End: 2025-05-30
Payer: MEDICARE

## 2025-05-30 NOTE — TELEPHONE ENCOUNTER
Dr Palacio, patient does not seem open to Prolia but will talk to you about it when she comes in 6/13        Spoke to patient to give results : per Dr Palacio Mammogram normal   Bone density shows osteoporosis. I recommend treating this with a medication to increase the bone strength and decrease risk of fractures.  I recommend Prolia, once every 6 month injection. If she is   open for this, I would send an order to  Specialty Pharmacy and then put a pharmacy referral for [facility], who would help determine if it's covered better by insurance injecting in our office vs   injecting at the [facility].     Patient understood

## 2025-05-30 NOTE — TELEPHONE ENCOUNTER
----- Message from Buster Palacio sent at 5/30/2025  6:47 AM EDT -----  Mammogram normal  Bone density shows osteoporosis. I recommend treating this with a medication to increase the bone strength and decrease risk of fractures.  I recommend Prolia, once every 6 month injection. If she is   open for this, I would send an order to  Specialty Pharmacy and then put a pharmacy referral for [facility], who would help determine if it's covered better by insurance injecting in our office vs   injecting at the [facility].  ----- Message -----  From: [facility] Newell - Imaging Results In  Sent: 5/29/2025   8:30 AM EDT  To: Buster Palacio MD

## 2025-06-05 PROCEDURE — RXMED WILLOW AMBULATORY MEDICATION CHARGE

## 2025-06-05 NOTE — PROGRESS NOTES
"Subjective   Patient ID: Destiny Reddy is a 68 y.o. female who presents for Follow-up.    HPI     Doing ok  No palpitations, no lightheadedness/dizziness  Tolerating xarelto, no bleeding issues.  +fatigue. Didn't schedule sleep study.    Review of Systems    Objective   /85   Pulse 84   Ht 1.689 m (5' 6.5\")   Wt 124 kg (273 lb)   SpO2 95%   BMI 43.40 kg/m²     Physical Exam  Constitutional:       Appearance: Normal appearance.   Cardiovascular:      Rate and Rhythm: Normal rate. Rhythm irregular.      Heart sounds: No murmur heard.  Pulmonary:      Effort: Pulmonary effort is normal.      Breath sounds: Normal breath sounds.   Musculoskeletal:      Right lower leg: No edema.      Left lower leg: No edema.   Neurological:      General: No focal deficit present.      Mental Status: She is alert.         Assessment/Plan   Problem List Items Addressed This Visit             ICD-10-CM    Type 2 diabetes mellitus with diabetic polyneuropathy, without long-term current use of insulin (CMS/[facility]) E11.42    Screening for colon cancer - Primary Z12.11    Relevant Orders    Cologuard® colon cancer screening    Atrial fibrillation (CMS/[facility]) I48.91    Relevant Medications    rivaroxaban (Xarelto) 15 mg tablet    Fatigue R53.83          DM - controlled  -Sees Optho  -With neuropathy. Has seen Podiatry but wont be following up with them. She doesn't want any med for this.     HTN - controlled     HLP - continue statin and gemfibrozil     CKD3 -stable     Anxiety - continue citalopram     GERD  - continue PPI, referred to GI     Afib    -Continue metoprolol   -Continue xarelto  -Get echo  -Sleep study     Health maintenance:  -Colon cancer screening - Never had colonoscopy. Referred to GI at last visit but now she would prefer Cologuard, this was ordered.  -Mammogram - Ordered  -Bone density - Ordered  -Immunizations    -Pneumonia - Prevnar 20 UTD   -Shingles - Shingrix UTD    Follow up 4 months  " 6/5/2025  Clinic Care Coordination Contact  Community Health Worker Initial Outreach    CHW Initial Information Gathering:  Referral Source: PCP  Preferred Hospital: [facility]  [phone]  Preferred Urgent Care: [facility], [phone]  Current living arrangement:: I live alone  Type of residence:: Apartment  Community Resources: Financial/Insurance, [facility] Programs  Supplies Currently Used at Home: Diabetic Supplies  Equipment Currently Used at Home: glucometer  Informal Support system:: Family, Children (mother and brothers, [facility] worker)  No PCP office visit in Past Year: No  Transportation means:: Medical transport (use [employer])  CHW Additional Questions  If ED/Hospital discharge, follow-up appointment scheduled as recommended?: N/A  Medication changes made following ED/Hospital discharge?: N/A  MyChart active?: No  Patient agreeable to assistance with activating MyChart?: No    Patient accepts CC: Yes. Patient scheduled for assessment with CC SW  on 6-13-25 at 3pm. Patient noted desire to discuss transportation to grocery store, CADI waiver for PCA services and support in the home, access VA services.   PCP referral: wondering about CADI waiver, Resources for Transportation    Called and spoke with patient regarding CCC referral from PCP.  Engaged in [employer].  Patient share background info:  -lives alone in an apartment  -has SNAP benefit $23/month  -has [employer] but has difficulty riding it because he does not being in the [place], too many people in the van.   He would a like personal ride   -vision impair   -has PTSD  -Certified disabled from [facility] [place].   -receives S.S.D[employer]I benefit  -  -had issues with VA doctor in [facility]. Would to access VA services and see a different VA doctor  -has MH providers  -has a representative that takes care of his bills, find resources, name is Eliane  [phone].  -has [facility] worker Melanie  [phone]  Will need ALL to [place]  Patient gave verbal permission to talk to Eliane and Melanie about care coordination services.  CHW encouraged patient to share his goals or needs with [facility] worker for support to access VA services and regarding CADI waiver.    CHW called Eliane and left VM to call back to clarify roles and services to coordinate care for pt.  CHW called [facility] worker Melanie [phone] and left VM to call   back to clarify roles and services to coordinate care for pt.    Patient wants:  -CADI waiver services-PCA, ILS worker to take to grocery store  -Access to VA doctor closer to home and VA services  -transportation to grocery store like Sarah Harman  Community Health Worker  [facility] Care Coordination  [email or web address]  [email or web address]   Office: [phone]  Fax: [phone]

## 2025-06-06 ENCOUNTER — PHARMACY VISIT (OUTPATIENT)
Dept: PHARMACY | Facility: CLINIC | Age: 70
End: 2025-06-06
Payer: MEDICARE

## 2025-06-13 ENCOUNTER — APPOINTMENT (OUTPATIENT)
Dept: PRIMARY CARE | Facility: CLINIC | Age: 70
End: 2025-06-13
Payer: MEDICARE

## 2025-06-13 VITALS
HEIGHT: 67 IN | DIASTOLIC BLOOD PRESSURE: 83 MMHG | SYSTOLIC BLOOD PRESSURE: 120 MMHG | WEIGHT: 247 LBS | OXYGEN SATURATION: 94 % | BODY MASS INDEX: 38.77 KG/M2 | HEART RATE: 98 BPM

## 2025-06-13 DIAGNOSIS — I10 PRIMARY HYPERTENSION: ICD-10-CM

## 2025-06-13 DIAGNOSIS — M81.0 AGE-RELATED OSTEOPOROSIS WITHOUT CURRENT PATHOLOGICAL FRACTURE: ICD-10-CM

## 2025-06-13 DIAGNOSIS — G47.33 OSA (OBSTRUCTIVE SLEEP APNEA): ICD-10-CM

## 2025-06-13 DIAGNOSIS — R73.9 HYPERGLYCEMIA: Primary | ICD-10-CM

## 2025-06-13 DIAGNOSIS — E66.01 MORBID OBESITY (MULTI): ICD-10-CM

## 2025-06-13 DIAGNOSIS — E11.42 TYPE 2 DIABETES MELLITUS WITH DIABETIC POLYNEUROPATHY, WITHOUT LONG-TERM CURRENT USE OF INSULIN: ICD-10-CM

## 2025-06-13 PROCEDURE — 4010F ACE/ARB THERAPY RXD/TAKEN: CPT | Performed by: INTERNAL MEDICINE

## 2025-06-13 PROCEDURE — 3074F SYST BP LT 130 MM HG: CPT | Performed by: INTERNAL MEDICINE

## 2025-06-13 PROCEDURE — 3079F DIAST BP 80-89 MM HG: CPT | Performed by: INTERNAL MEDICINE

## 2025-06-13 PROCEDURE — 99214 OFFICE O/P EST MOD 30 MIN: CPT | Performed by: INTERNAL MEDICINE

## 2025-06-13 PROCEDURE — 1159F MED LIST DOCD IN RCRD: CPT | Performed by: INTERNAL MEDICINE

## 2025-06-13 PROCEDURE — 1036F TOBACCO NON-USER: CPT | Performed by: INTERNAL MEDICINE

## 2025-06-13 PROCEDURE — 3008F BODY MASS INDEX DOCD: CPT | Performed by: INTERNAL MEDICINE

## 2025-06-13 PROCEDURE — G2211 COMPLEX E/M VISIT ADD ON: HCPCS | Performed by: INTERNAL MEDICINE

## 2025-06-13 RX ORDER — BLOOD-GLUCOSE,RECEIVER,CONT
1 EACH MISCELLANEOUS AS NEEDED
COMMUNITY
End: 2025-06-13 | Stop reason: SDUPTHER

## 2025-06-13 RX ORDER — BLOOD-GLUCOSE,RECEIVER,CONT
EACH MISCELLANEOUS
Qty: 1 EACH | Refills: 0 | Status: SHIPPED | OUTPATIENT
Start: 2025-06-13

## 2025-06-13 RX ORDER — BLOOD-GLUCOSE SENSOR
EACH MISCELLANEOUS
Qty: 6 EACH | Refills: 1 | Status: SHIPPED | OUTPATIENT
Start: 2025-06-13

## 2025-06-13 ASSESSMENT — PATIENT HEALTH QUESTIONNAIRE - PHQ9
SUM OF ALL RESPONSES TO PHQ9 QUESTIONS 1 & 2: 0
1. LITTLE INTEREST OR PLEASURE IN DOING THINGS: NOT AT ALL
2. FEELING DOWN, DEPRESSED OR HOPELESS: NOT AT ALL

## 2025-06-13 NOTE — PROGRESS NOTES
"Subjective   Patient ID: Destiny Reddy is a 70 y.o. female who presents for Follow-up.    HPI   Weight down 10 pounds.  Tolerating mounjaro, no side effects  Appetite is lower    Sleep study showed severe ALETHEA but she refuses CPAP.    Off hydrochlorothiazide  BP looks well  Not checking BP at home.    Discussed bone density    Review of Systems    Objective   /83 (BP Location: Right arm, Patient Position: Sitting)   Pulse 98   Ht 1.689 m (5' 6.5\")   Wt 112 kg (247 lb)   SpO2 94%   BMI 39.27 kg/m²     Physical Exam  Constitutional:       Appearance: Normal appearance.   Cardiovascular:      Rate and Rhythm: Normal rate. Rhythm irregular.      Heart sounds: No murmur heard.  Pulmonary:      Effort: Pulmonary effort is normal.      Breath sounds: Normal breath sounds.   Musculoskeletal:      Right lower leg: No edema.      Left lower leg: No edema.   Neurological:      General: No focal deficit present.      Mental Status: She is alert.         Assessment/Plan   Problem List Items Addressed This Visit           ICD-10-CM    HTN (hypertension) I10    Morbid obesity (Multi) E66.01    Type 2 diabetes mellitus with diabetic polyneuropathy, without long-term current use of insulin E11.42    Relevant Medications    blood-glucose,,cont (FreeStyle Мария 3 Oliver) misc    blood-glucose sensor (FreeStyle Мария 3 Sensor) device    Other Relevant Orders    Hemoglobin A1C    Comprehensive Metabolic Panel    ALETHEA (obstructive sleep apnea) G47.33    Age-related osteoporosis without current pathological fracture M81.0     Other Visit Diagnoses         Codes      Hyperglycemia    -  Primary R73.9               DM - check a1c  -Continue jardiance  -Continue mounjaro  -Sees Optho  -With neuropathy. Has seen Podiatry but wont be following up with them. She doesn't want any med for this.     HTN - controlled      HLP - continue statin and gemfibrozil     CKD3 -stable     Anxiety - conitnue citalopram     GERD  - continue " PPI, referred to GI at prior visit     Afib    -Continue metoprolol   -Continue xarelto  -Refused echo    ALETHEA - pt refuses CPAP     Fatigue  -discussed treating ALETHEA, she refuses. Discussed continuing weight loss.     LE weakness  -Recommended PT but she declines    Osteoporosis - recommended prolia, she will consider     Health maintenance:  -Colon cancer screening - overdue, never done, order is in  -Mammogram - nml 5/2025  -Bone density - as above  -Immunizations    -Pneumonia - Prevnar 20 UTD   -Shingles - Shingrix UTD     Follow up 4 months

## 2025-06-14 LAB
ALBUMIN SERPL-MCNC: 4.2 G/DL (ref 3.6–5.1)
ALP SERPL-CCNC: 93 U/L (ref 37–153)
ALT SERPL-CCNC: 9 U/L (ref 6–29)
ANION GAP SERPL CALCULATED.4IONS-SCNC: 11 MMOL/L (CALC) (ref 7–17)
AST SERPL-CCNC: 14 U/L (ref 10–35)
BILIRUB SERPL-MCNC: 0.6 MG/DL (ref 0.2–1.2)
BUN SERPL-MCNC: 23 MG/DL (ref 7–25)
CALCIUM SERPL-MCNC: 9.6 MG/DL (ref 8.6–10.4)
CHLORIDE SERPL-SCNC: 103 MMOL/L (ref 98–110)
CO2 SERPL-SCNC: 24 MMOL/L (ref 20–32)
CREAT SERPL-MCNC: 1.47 MG/DL (ref 0.6–1)
EGFRCR SERPLBLD CKD-EPI 2021: 38 ML/MIN/1.73M2
EST. AVERAGE GLUCOSE BLD GHB EST-MCNC: 146 MG/DL
EST. AVERAGE GLUCOSE BLD GHB EST-SCNC: 8.1 MMOL/L
GLUCOSE SERPL-MCNC: 157 MG/DL (ref 65–99)
HBA1C MFR BLD: 6.7 %
POTASSIUM SERPL-SCNC: 4.6 MMOL/L (ref 3.5–5.3)
PROT SERPL-MCNC: 7 G/DL (ref 6.1–8.1)
SODIUM SERPL-SCNC: 138 MMOL/L (ref 135–146)

## 2025-06-17 ENCOUNTER — TELEPHONE (OUTPATIENT)
Dept: PRIMARY CARE | Facility: CLINIC | Age: 70
End: 2025-06-17
Payer: MEDICARE

## 2025-06-17 DIAGNOSIS — E11.42 TYPE 2 DIABETES MELLITUS WITH DIABETIC POLYNEUROPATHY, WITHOUT LONG-TERM CURRENT USE OF INSULIN: ICD-10-CM

## 2025-06-17 NOTE — TELEPHONE ENCOUNTER
----- Message from Buster Palacio sent at 6/14/2025 10:28 AM EDT -----  A1C now controlled at 6.7. Rest of labs stable. Continue same meds.  ----- Message -----  From: [facility] Results In  Sent: 6/14/2025   4:46 AM EDT  To: Buster Palacio MD

## 2025-06-20 NOTE — PROGRESS NOTES
Clinical Pharmacy Appointment    Patient ID: Destiny Reddy is a 70 y.o. female who presents for Diabetes.    Pt is here for Follow Up appointment.     Referring Provider: Buster Palacio MD  PCP: Buster Palacio MD   Last visit with PCP: 6/13/2025  Next visit with PCP: 10/13/2025    Subjective   Drug Interactions  No relevant drug interactions were noted.    Medication System Management  Patient's preferred pharmacy:  Pharmacy  Adherence/Organization: no concerns  Affordability/Accessibility: [facility] until 10/1/2025    HPI  Type II Diabetes  Current  Pharmacotherapy:   Jardiance 25 mg daily (started after 3/20/25)  Mounjaro 7.5 mg once weekly (ordered 3/26/25)     SECONDARY PREVENTION  - Statin? Pravastatin 10 mg daily   LDL: 87   TC:  - ACE-I/ARB? Lisinopril 5 mg daily  ALBUMIN/CREATININE RATIO, RANDOM URINE   Date Value Ref Range Status   03/13/2025 20 <30 mg/g creat Final     Comment:        The ADA defines abnormalities in albumin  excretion as follows:     Albuminuria Category        Result (mg/g creatinine)     Normal to Mildly increased   <30  Moderately increased            Severely increased           > OR = 300     The ADA recommends that at least two of three  specimens collected within a 3-6 month period be  abnormal before considering a patient to be  within a diagnostic category.       Albumin/Creatinine Ratio   Date Value Ref Range Status   02/27/2024 44.8 (H) <30.0 ug/mg Creat Final     Albumin/Creatine Ratio   Date Value Ref Range Status   06/13/2023 13.3 0.0 - 30.0 ug/mg crt Final     - Aspirin? no    Current monitoring regimen:   Patient is using: starting Freestyle Мария 3     SMBG Fasting Readings: N/A    Hypoglycemia? None reported    Complications:  - ALETHEA: pending sleep study  - CKD: stage 3a    -The 10-year ASCVD risk score (Raymundo KHAN, et al., 2019) is: 21.5%    Values used to calculate the score:      Age: 70 years      Sex: Female      Is Non- : No       Diabetic: Yes      Tobacco smoker: No      Systolic Blood Pressure: 120 mmHg      Is BP treated: Yes      HDL Cholesterol: 60 mg/dL      Total Cholesterol: 171 mg/dL      Diet/Lifestyle:   Not discussed this visit due to time    Objective   Allergies   Allergen Reactions    Other Unknown     Sugar water/IV     Social History     Social History Narrative    Not on file      Medication Review  Current Outpatient Medications   Medication Instructions    blood sugar diagnostic (OneTouch Ultra Test) Use to test blood glucose once daily    blood-glucose sensor (FREESTYLE HEAVEN 3 SENSOR MISC) No dose, route, or frequency recorded.    blood-glucose sensor (FreeStyle Heaven 3 Sensor) device Apply new sensor every 14 days    blood-glucose,,cont (FreeStyle Heaven 3 [place]) misc Use as instructed    cholecalciferol (Vitamin D-3) 5,000 Units tablet oral    citalopram (CELEXA) 10 mg, oral, Daily    empagliflozin (JARDIANCE) 25 mg, oral, Daily    gemfibrozil (LOPID) 600 mg, oral, Daily    glucose 4 gram chewable tablet oral    lancets (OneTouch Delica Plus Lancet) 33 gauge misc Use to test blood glucose once daily    lisinopril 5 mg, oral, Daily    metoprolol tartrate (LOPRESSOR) 50 mg, oral, 2 times daily    Mounjaro 7.5 mg, subcutaneous, Weekly    naproxen sodium (ALEVE) 220 mg, oral, 2 times daily    omeprazole (PriLOSEC) 40 mg DR capsule 1 capsule, oral, Daily    OneTouch Ultra2 Meter misc Use to test blood glucose once daily    pravastatin (Pravachol) 10 mg tablet 1 tablet, oral, Daily    rivaroxaban (XARELTO) 15 mg, oral, Daily, Take with food.    vit C-s.cherry-celery-grape sd (Tart Cherry) -73-75-20 mg capsule 1 capsule, oral, Daily      Vitals  BP Readings from Last 2 Encounters:   06/13/25 120/83   03/13/25 97/68     BMI Readings from Last 1 Encounters:   06/13/25 39.27 kg/m²      Labs  A1C  Lab Results   Component Value Date    HGBA1C 6.7 (H) 06/13/2025    HGBA1C 9.2 (H) 03/13/2025    HGBA1C 5.9 (H)  09/12/2024     BMP  Lab Results   Component Value Date    CALCIUM 9.6 06/13/2025     06/13/2025    K 4.6 06/13/2025    CO2 24 06/13/2025     06/13/2025    BUN 23 06/13/2025    CREATININE 1.47 (H) 06/13/2025    EGFR 38 (L) 06/13/2025     LFTs  Lab Results   Component Value Date    ALT 9 06/13/2025    AST 14 06/13/2025    ALKPHOS 93 06/13/2025    BILITOT 0.6 06/13/2025     FLP  Lab Results   Component Value Date    TRIG 141 03/13/2025    CHOL 171 03/13/2025    LDLF 102 (H) 06/13/2023    LDLCALC 87 03/13/2025    HDL 60 03/13/2025     Urine Microalbumin  Lab Results   Component Value Date    MICROALBCREA 20 03/13/2025     Weight Management  Wt Readings from Last 3 Encounters:   06/13/25 112 kg (247 lb)   03/13/25 117 kg (257 lb)   09/12/24 122 kg (269 lb)      There is no height or weight on file to calculate BMI.     Assessment/Plan   Problem List Items Addressed This Visit       Type 2 diabetes mellitus with diabetic polyneuropathy, without long-term current use of insulin    Relevant Orders    Referral to Clinical Pharmacy     Patient's goal A1c is < 7%.  Is pt at goal? Yes, 6.7%  Patient's SMBGs are **not currently testing. Patient obtaining Freestyle Мария 3 due to glucometer not working appropriately.    Rationale for plan: Patient controlled with recent A1c of 6.7%. Patient tolerating Mounjaro 7.5 mg once weekly and Jardiance 25 mg daily. Plan to continue current regimen and follow up in 4 weeks to assess FBG readings. Discussed weight loss goals with Mounjaro. Discussed weight loss can directly benefit glycemic control which is the primary goal.    Discussed testing blood glucose in morning at least 3-4 times per week once starting CGM.     Patient approved fro [facility] until 10/1/2025.    Medication Changes:  CONTINUE  Jardiance 25 mg daily  Mounjaro 7.5 mg once weekly    Future Considerations:  Increase Mounjaro as tolerated for glycemic control and weight loss benefit.    Monitoring and  Education:  Discussed the titration schedule of Mounjaro. Discussed side effects and how to minimize based on how the medication works to lower blood glucose level.    Clinical Pharmacist follow-up: 8/19/2025 @1020, Telehealth visit    Continue all meds under the continuation of care with the referring provider and clinical pharmacy team.    Jose Wilkinson PharmD    Verbal consent to manage patient's drug therapy was obtained from the patient. They were informed they may decline to participate or withdraw from participation in pharmacy services at any time.

## 2025-06-24 ENCOUNTER — APPOINTMENT (OUTPATIENT)
Dept: PHARMACY | Facility: HOSPITAL | Age: 70
End: 2025-06-24
Payer: MEDICARE

## 2025-06-24 DIAGNOSIS — E11.42 TYPE 2 DIABETES MELLITUS WITH DIABETIC POLYNEUROPATHY, WITHOUT LONG-TERM CURRENT USE OF INSULIN: ICD-10-CM

## 2025-07-02 PROCEDURE — RXMED WILLOW AMBULATORY MEDICATION CHARGE

## 2025-07-03 ENCOUNTER — PHARMACY VISIT (OUTPATIENT)
Dept: PHARMACY | Facility: CLINIC | Age: 70
End: 2025-07-03
Payer: MEDICARE

## 2025-07-22 ENCOUNTER — HOSPITAL ENCOUNTER (OUTPATIENT)
Dept: RADIOLOGY | Facility: CLINIC | Age: 70
Discharge: HOME | End: 2025-07-22
Payer: MEDICARE

## 2025-07-22 ENCOUNTER — OFFICE VISIT (OUTPATIENT)
Dept: PRIMARY CARE | Facility: CLINIC | Age: 70
End: 2025-07-22
Payer: MEDICARE

## 2025-07-22 ENCOUNTER — TELEPHONE (OUTPATIENT)
Dept: PRIMARY CARE | Facility: CLINIC | Age: 70
End: 2025-07-22

## 2025-07-22 VITALS
HEIGHT: 67 IN | WEIGHT: 243.6 LBS | OXYGEN SATURATION: 94 % | SYSTOLIC BLOOD PRESSURE: 99 MMHG | DIASTOLIC BLOOD PRESSURE: 68 MMHG | BODY MASS INDEX: 38.24 KG/M2 | HEART RATE: 97 BPM
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
## 2025-07-22 DIAGNOSIS — S09.93XA FACIAL INJURY, INITIAL ENCOUNTER: ICD-10-CM
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
## 2025-07-22 DIAGNOSIS — S09.93XA FACIAL INJURY: ICD-10-CM

## 2025-07-22 DIAGNOSIS — S09.90XA INJURY OF HEAD, INITIAL ENCOUNTER: ICD-10-CM

## 2025-07-22 PROCEDURE — 76377 3D RENDER W/INTRP POSTPROCES: CPT

## 2025-07-22 PROCEDURE — 70486 CT MAXILLOFACIAL W/O DYE: CPT | Performed by: RADIOLOGY

## 2025-07-22 PROCEDURE — 70486 CT MAXILLOFACIAL W/O DYE: CPT

## 2025-07-22 PROCEDURE — 70450 CT HEAD/BRAIN W/O DYE: CPT | Performed by: RADIOLOGY

## 2025-07-22 PROCEDURE — 3078F DIAST BP <80 MM HG: CPT | Performed by: INTERNAL MEDICINE

## 2025-07-22 PROCEDURE — 3008F BODY MASS INDEX DOCD: CPT | Performed by: INTERNAL MEDICINE

## 2025-07-22 PROCEDURE — 70450 CT HEAD/BRAIN W/O DYE: CPT

## 2025-07-22 PROCEDURE — 99214 OFFICE O/P EST MOD 30 MIN: CPT | Performed by: INTERNAL MEDICINE

## 2025-07-22 PROCEDURE — G2211 COMPLEX E/M VISIT ADD ON: HCPCS | Performed by: INTERNAL MEDICINE

## 2025-07-22 PROCEDURE — 76377 3D RENDER W/INTRP POSTPROCES: CPT | Performed by: RADIOLOGY

## 2025-07-22 PROCEDURE — 3074F SYST BP LT 130 MM HG: CPT | Performed by: INTERNAL MEDICINE

## 2025-07-22 PROCEDURE — 1159F MED LIST DOCD IN RCRD: CPT | Performed by: INTERNAL MEDICINE

## 2025-07-22 ASSESSMENT — COLUMBIA-SUICIDE SEVERITY RATING SCALE - C-SSRS
2. HAVE YOU ACTUALLY HAD ANY THOUGHTS OF KILLING YOURSELF?: NO
6. HAVE YOU EVER DONE ANYTHING, STARTED TO DO ANYTHING, OR PREPARED TO DO ANYTHING TO END YOUR LIFE?: NO
1. IN THE PAST MONTH, HAVE YOU WISHED YOU WERE DEAD OR WISHED YOU COULD GO TO SLEEP AND NOT WAKE UP?: NO

## 2025-07-22 NOTE — PROGRESS NOTES
"Subjective   Patient ID: Destiny Reddy is a 70 y.o. female who presents for Fall (Hit head) and Knee Injury.    HPI   8 days ago, was going out to garage and missed a half step going into her garage. Fell onto L knee and fell forward, head hit car. Hit head on L forehead/eye. No LOC, no confusion, no n/v.  Eating and drinking ok.  Bruising/swelling is improved. Some blurry vision in L eye, she will be scheduling with her Optho.  No pain walking on knee    Review of Systems    Objective   BP 99/68   Pulse 97   Ht 1.689 m (5' 6.5\")   Wt 110 kg (243 lb 9.6 oz)   SpO2 94%   BMI 38.73 kg/m²     Physical Exam  Constitutional:       Appearance: Normal appearance.     Eyes:      Extraocular Movements: Extraocular movements intact.      Pupils: Pupils are equal, round, and reactive to light.       Cardiovascular:      Rate and Rhythm: Normal rate. Rhythm irregular.      Heart sounds: No murmur heard.  Pulmonary:      Effort: Pulmonary effort is normal.      Breath sounds: Normal breath sounds.     Musculoskeletal:      Right lower leg: No edema.      Left lower leg: No edema.      Comments: Ecchymosis over L knee, but knee full ROM and nontender     Skin:     Comments: Ecchymoses and swelling around L eye     Neurological:      General: No focal deficit present.      Mental Status: She is alert.      Cranial Nerves: No cranial nerve deficit.      Motor: No weakness.         Assessment/Plan   Problem List Items Addressed This Visit           ICD-10-CM    Fall - Primary W19.XXXA    Relevant Orders    CT head wo IV contrast    CT maxillofacial bones wo IV contrast    Head injury S09.90XA    Relevant Orders    CT head wo IV contrast    CT maxillofacial bones wo IV contrast    Facial injury S09.93XA    Relevant Orders    CT head wo IV contrast    CT maxillofacial bones wo IV contrast          Fall/head injury one week ago. Bruising is improved but given the bruising and swelling and the mechanism of injury will get CT head and " facial bones.

## 2025-07-23 ENCOUNTER — RESULTS FOLLOW-UP (OUTPATIENT)
Dept: PRIMARY CARE | Facility: CLINIC | Age: 70
End: 2025-07-23
Payer: MEDICARE

## 2025-07-23 DIAGNOSIS — G93.0 ARACHNOID CYST: Primary | ICD-10-CM

## 2025-07-23 NOTE — TELEPHONE ENCOUNTER
I spoke to both Destiny and her  to give results.  Per Dr Palacio CTs didn't show any fracture. But it did incidentally show a cyst int he brain, which are typically benign and dont require any treatment. But this is somewhat large at over 3cm so I would like her to see a Neurosurgeon to get their opinion on this. I placed a referral.     The patient will call to schedule the appointment

## 2025-07-23 NOTE — TELEPHONE ENCOUNTER
----- Message from Buster Palacio sent at 7/23/2025  9:23 AM EDT -----  CTs didn't show any fracture. But it did incidentally show a cyst int he brain, which are typically benign and dont require any treatment. But this is somewhat large at over 3cm so I would like her   to see a Neurosurgeon to get their opinion on this. I placed a referral.  ----- Message -----  From: Interface, Radiology Results In  Sent: 7/22/2025   2:30 PM EDT  To: Buster Palacio MD

## 2025-07-24 ENCOUNTER — OFFICE VISIT (OUTPATIENT)
Dept: NEUROSURGERY | Facility: CLINIC | Age: 70
End: 2025-07-24
Payer: MEDICARE

## 2025-07-24 VITALS
HEIGHT: 67 IN | BODY MASS INDEX: 37.98 KG/M2 | WEIGHT: 242 LBS | TEMPERATURE: 97.2 F | SYSTOLIC BLOOD PRESSURE: 103 MMHG | DIASTOLIC BLOOD PRESSURE: 71 MMHG | HEART RATE: 85 BPM

## 2025-07-24 DIAGNOSIS — F40.9 PHOBIA, UNSPECIFIED TYPE: ICD-10-CM

## 2025-07-24 DIAGNOSIS — G93.0 ARACHNOID CYST: Primary | ICD-10-CM

## 2025-07-24 PROCEDURE — 99202 OFFICE O/P NEW SF 15 MIN: CPT | Performed by: NURSE PRACTITIONER

## 2025-07-24 PROCEDURE — 3074F SYST BP LT 130 MM HG: CPT | Performed by: NURSE PRACTITIONER

## 2025-07-24 PROCEDURE — 1159F MED LIST DOCD IN RCRD: CPT | Performed by: NURSE PRACTITIONER

## 2025-07-24 PROCEDURE — 99212 OFFICE O/P EST SF 10 MIN: CPT | Performed by: NURSE PRACTITIONER

## 2025-07-24 PROCEDURE — 1126F AMNT PAIN NOTED NONE PRSNT: CPT | Performed by: NURSE PRACTITIONER

## 2025-07-24 PROCEDURE — 3008F BODY MASS INDEX DOCD: CPT | Performed by: NURSE PRACTITIONER

## 2025-07-24 PROCEDURE — 3078F DIAST BP <80 MM HG: CPT | Performed by: NURSE PRACTITIONER

## 2025-07-24 RX ORDER — LORAZEPAM 0.5 MG/1
TABLET ORAL
Qty: 2 TABLET | Refills: 0 | Status: SHIPPED | OUTPATIENT
Start: 2025-07-24

## 2025-07-24 ASSESSMENT — PAIN SCALES - GENERAL: PAINLEVEL_OUTOF10: 0-NO PAIN

## 2025-07-25 ENCOUNTER — TELEPHONE (OUTPATIENT)
Dept: PHARMACY | Facility: HOSPITAL | Age: 70
End: 2025-07-25
Payer: MEDICARE

## 2025-07-25 NOTE — TELEPHONE ENCOUNTER
Patient called and stated that Mounjaro shot misfired.   Instructed to call [facility] to get replacement fill.     Reminded of next appt in August with Scarlett.     Jessi Longoria, PharmD, [facility]  Clinical Pharmacist  [phone]

## 2025-08-08 DIAGNOSIS — E55.9 VITAMIN D DEFICIENCY: Primary | ICD-10-CM

## 2025-08-08 RX ORDER — ACETAMINOPHEN 500 MG
125 TABLET ORAL DAILY
Qty: 90 TABLET | Refills: 1 | Status: SHIPPED | OUTPATIENT
Start: 2025-08-08 | End: 2026-02-04

## 2025-08-08 NOTE — PROGRESS NOTES
"[facility]  Neurosurgery    History of Present Illness                        Objective      Vitals:   /71   Pulse 85   Temp 36.2 °C (97.2 °F)   Ht 1.689 m (5' 6.5\")   Wt 110 kg (242 lb)   BMI 38.47 kg/m²         Physical Exam:            Relevant Results: I have personally reviewed the results below.             Assessment & Plan      Diagnosis:  Diagnoses and all orders for this visit:  Arachnoid cyst  -     MR brain w and wo IV contrast; Future  Phobia, unspecified type  -     LORazepam (Ativan) 0.5 mg tablet; Take one tablet (0.5mg) 45 minutes prior to your MRI. You may repeat this dose immediately prior as needed for anxiety.  Other orders  -     Referral to Neurosurgery          Provider Impression:         Medical History     Medical History[1]  Surgical History[2]  Social History[3]  Family History[4]  Allergies[5]  Current Outpatient Medications   Medication Instructions    blood sugar diagnostic (OneTouch Ultra Test) Use to test blood glucose once daily    blood-glucose sensor (FREESTYLE HEAVEN 3 SENSOR MISC) No dose, route, or frequency recorded.    blood-glucose sensor (FreeStyle Heaven 3 Sensor) device Apply new sensor every 14 days    blood-glucose,,cont (FreeStyle Heaven 3 [place]) misc Use as instructed    cholecalciferol (Vitamin D-3) 5,000 Units tablet Take by mouth.    citalopram (CELEXA) 10 mg, oral, Daily    empagliflozin (JARDIANCE) 25 mg, oral, Daily    gemfibrozil (LOPID) 600 mg, oral, Daily    glucose 4 gram chewable tablet Chew.    lancets (OneTouch Delica Plus Lancet) 33 gauge misc Use to test blood glucose once daily    LORazepam (Ativan) 0.5 mg tablet Take one tablet (0.5mg) 45 minutes prior to your MRI. You may repeat this dose immediately prior as needed for anxiety.    metoprolol tartrate (LOPRESSOR) 50 mg, oral, 2 times daily    Mounjaro 7.5 mg, subcutaneous, Weekly    naproxen sodium (ALEVE) 220 mg, 2 times daily    omeprazole (PriLOSEC) 40 mg DR capsule 1 " capsule, Daily    OneTouch Ultra2 Meter misc Use to test blood glucose once daily    pravastatin (Pravachol) 10 mg tablet 1 tablet, Daily    rivaroxaban (XARELTO) 15 mg, oral, Daily, Take with food.    vit C-s.cherry-celery-grape sd (Tart Cherry) -06-75-20 mg capsule 1 capsule, Daily                              [1] No past medical history on file.  [2] No past surgical history on file.  [3]   Social History  Tobacco Use    Smoking status: Former     Current packs/day: 0.00     Types: Cigarettes     Quit date:      Years since quittin.6     Passive exposure: Past    Smokeless tobacco: Never   Vaping Use    Vaping status: Never Used   Substance Use Topics    Alcohol use: Never    Drug use: Never   [4]   Family History  Problem Relation Name Age of Onset    Hypertension Mother      Pneumonia Father     [5]   Allergies  Allergen Reactions    Other Unknown     Sugar water/IV

## 2025-08-12 ENCOUNTER — HOSPITAL ENCOUNTER (OUTPATIENT)
Dept: RADIOLOGY | Facility: CLINIC | Age: 70
Discharge: HOME | End: 2025-08-12
Payer: MEDICARE

## 2025-08-12 DIAGNOSIS — G93.0 ARACHNOID CYST: ICD-10-CM

## 2025-08-12 PROCEDURE — A9575 INJ GADOTERATE MEGLUMI 0.1ML: HCPCS | Performed by: NURSE PRACTITIONER

## 2025-08-12 PROCEDURE — 70553 MRI BRAIN STEM W/O & W/DYE: CPT | Performed by: RADIOLOGY

## 2025-08-12 PROCEDURE — 2550000001 HC RX 255 CONTRASTS: Performed by: NURSE PRACTITIONER

## 2025-08-12 PROCEDURE — 70553 MRI BRAIN STEM W/O & W/DYE: CPT

## 2025-08-12 RX ORDER — GADOTERATE MEGLUMINE 376.9 MG/ML
20 INJECTION INTRAVENOUS
Status: COMPLETED | OUTPATIENT
Start: 2025-08-12 | End: 2025-08-12

## 2025-08-12 RX ADMIN — GADOTERATE MEGLUMINE 20 ML: 376.9 INJECTION INTRAVENOUS at 10:56

## 2025-08-14 ENCOUNTER — APPOINTMENT (OUTPATIENT)
Dept: RADIOLOGY | Facility: CLINIC | Age: 70
End: 2025-08-14
Payer: MEDICARE

## 2025-08-14 DIAGNOSIS — K21.9 GASTROESOPHAGEAL REFLUX DISEASE WITHOUT ESOPHAGITIS: Primary | ICD-10-CM

## 2025-08-14 DIAGNOSIS — E78.2 MIXED HYPERLIPIDEMIA: ICD-10-CM

## 2025-08-14 DIAGNOSIS — E11.42 TYPE 2 DIABETES MELLITUS WITH DIABETIC POLYNEUROPATHY, WITHOUT LONG-TERM CURRENT USE OF INSULIN: ICD-10-CM

## 2025-08-14 DIAGNOSIS — I48.19 PERSISTENT ATRIAL FIBRILLATION (MULTI): ICD-10-CM

## 2025-08-14 DIAGNOSIS — E55.9 VITAMIN D DEFICIENCY: ICD-10-CM

## 2025-08-14 DIAGNOSIS — F41.1 GENERALIZED ANXIETY DISORDER: ICD-10-CM

## 2025-08-14 RX ORDER — GEMFIBROZIL 600 MG/1
600 TABLET, FILM COATED ORAL DAILY
Qty: 90 TABLET | Refills: 3 | Status: SHIPPED | OUTPATIENT
Start: 2025-08-14

## 2025-08-14 RX ORDER — OMEPRAZOLE 40 MG/1
40 CAPSULE, DELAYED RELEASE ORAL DAILY
Qty: 90 CAPSULE | Refills: 1 | Status: SHIPPED | OUTPATIENT
Start: 2025-08-14

## 2025-08-14 RX ORDER — ACETAMINOPHEN 500 MG
125 TABLET ORAL DAILY
Qty: 90 TABLET | Refills: 1 | Status: SHIPPED | OUTPATIENT
Start: 2025-08-14 | End: 2026-02-10

## 2025-08-14 RX ORDER — CITALOPRAM 10 MG/1
10 TABLET ORAL DAILY
Qty: 90 TABLET | Refills: 3 | Status: SHIPPED | OUTPATIENT
Start: 2025-08-14 | End: 2026-08-14

## 2025-08-14 RX ORDER — TIRZEPATIDE 7.5 MG/.5ML
7.5 INJECTION, SOLUTION SUBCUTANEOUS WEEKLY
Qty: 2 ML | Refills: 3 | Status: SHIPPED | OUTPATIENT
Start: 2025-08-14

## 2025-08-14 RX ORDER — METOPROLOL TARTRATE 50 MG/1
50 TABLET ORAL 2 TIMES DAILY
Qty: 180 TABLET | Refills: 3 | Status: SHIPPED | OUTPATIENT
Start: 2025-08-14 | End: 2026-08-14

## 2025-08-19 ENCOUNTER — APPOINTMENT (OUTPATIENT)
Dept: PHARMACY | Facility: HOSPITAL | Age: 70
End: 2025-08-19
Payer: MEDICARE

## 2025-08-25 DIAGNOSIS — E11.42 TYPE 2 DIABETES MELLITUS WITH DIABETIC POLYNEUROPATHY, WITHOUT LONG-TERM CURRENT USE OF INSULIN: Primary | ICD-10-CM

## 2025-08-28 ENCOUNTER — TELEMEDICINE (OUTPATIENT)
Dept: PHARMACY | Facility: HOSPITAL | Age: 70
End: 2025-08-28
Payer: MEDICARE

## 2025-08-28 DIAGNOSIS — E11.42 TYPE 2 DIABETES MELLITUS WITH DIABETIC POLYNEUROPATHY, WITHOUT LONG-TERM CURRENT USE OF INSULIN: ICD-10-CM

## 2025-08-28 DIAGNOSIS — I48.19 PERSISTENT ATRIAL FIBRILLATION (MULTI): ICD-10-CM

## 2025-08-28 PROCEDURE — RXMED WILLOW AMBULATORY MEDICATION CHARGE

## 2025-08-28 RX ORDER — TIRZEPATIDE 10 MG/.5ML
10 INJECTION, SOLUTION SUBCUTANEOUS WEEKLY
Qty: 2 ML | Refills: 1 | Status: SHIPPED | OUTPATIENT
Start: 2025-08-28

## 2025-08-30 ENCOUNTER — PHARMACY VISIT (OUTPATIENT)
Dept: PHARMACY | Facility: CLINIC | Age: 70
End: 2025-08-30
Payer: MEDICARE

## 2025-09-11 ENCOUNTER — APPOINTMENT (OUTPATIENT)
Dept: PHARMACY | Facility: HOSPITAL | Age: 70
End: 2025-09-11
Payer: MEDICARE

## 2025-10-13 ENCOUNTER — APPOINTMENT (OUTPATIENT)
Dept: PRIMARY CARE | Facility: CLINIC | Age: 70
End: 2025-10-13
Payer: MEDICARE